# Patient Record
Sex: MALE | Race: WHITE | NOT HISPANIC OR LATINO
[De-identification: names, ages, dates, MRNs, and addresses within clinical notes are randomized per-mention and may not be internally consistent; named-entity substitution may affect disease eponyms.]

---

## 2018-04-05 ENCOUNTER — RX RENEWAL (OUTPATIENT)
Age: 63
End: 2018-04-05

## 2018-04-05 DIAGNOSIS — I10 ESSENTIAL (PRIMARY) HYPERTENSION: ICD-10-CM

## 2020-10-28 ENCOUNTER — APPOINTMENT (OUTPATIENT)
Dept: ORTHOPEDIC SURGERY | Facility: CLINIC | Age: 65
End: 2020-10-28
Payer: MEDICARE

## 2020-10-28 VITALS
SYSTOLIC BLOOD PRESSURE: 125 MMHG | HEART RATE: 56 BPM | WEIGHT: 280 LBS | BODY MASS INDEX: 39.2 KG/M2 | HEIGHT: 71 IN | DIASTOLIC BLOOD PRESSURE: 81 MMHG

## 2020-10-28 DIAGNOSIS — M48.07 SPINAL STENOSIS, LUMBOSACRAL REGION: ICD-10-CM

## 2020-10-28 PROCEDURE — 99204 OFFICE O/P NEW MOD 45 MIN: CPT

## 2020-11-05 ENCOUNTER — APPOINTMENT (OUTPATIENT)
Dept: SPINE | Facility: CLINIC | Age: 65
End: 2020-11-05
Payer: MEDICARE

## 2020-11-05 VITALS
HEART RATE: 68 BPM | HEIGHT: 70.5 IN | OXYGEN SATURATION: 93 % | SYSTOLIC BLOOD PRESSURE: 112 MMHG | DIASTOLIC BLOOD PRESSURE: 78 MMHG | WEIGHT: 280 LBS | BODY MASS INDEX: 39.64 KG/M2

## 2020-11-05 PROCEDURE — 99203 OFFICE O/P NEW LOW 30 MIN: CPT

## 2020-11-05 RX ORDER — TAMSULOSIN HCL 0.4 MG
CAPSULE ORAL
Refills: 0 | Status: ACTIVE | COMMUNITY

## 2020-11-05 RX ORDER — TRIAMTERENE AND HYDROCHLOROTHIAZIDE 25; 37.5 MG/1; MG/1
37.5-25 TABLET ORAL
Refills: 0 | Status: ACTIVE | COMMUNITY

## 2020-11-05 RX ORDER — QUINAPRIL HYDROCHLORIDE 20 MG/1
20 TABLET, FILM COATED ORAL
Refills: 0 | Status: ACTIVE | COMMUNITY

## 2020-11-05 RX ORDER — OXYCODONE HYDROCHLORIDE AND ACETAMINOPHEN 5; 325 MG/1; MG/1
5-325 TABLET ORAL
Refills: 0 | Status: ACTIVE | COMMUNITY

## 2020-11-05 RX ORDER — OMEPRAZOLE 40 MG/1
40 CAPSULE, DELAYED RELEASE ORAL
Refills: 0 | Status: ACTIVE | COMMUNITY

## 2020-11-05 RX ORDER — AMLODIPINE BESYLATE 5 MG/1
5 TABLET ORAL DAILY
Qty: 90 | Refills: 3 | Status: DISCONTINUED | COMMUNITY
Start: 2018-04-05 | End: 2020-11-05

## 2020-11-05 RX ORDER — ATORVASTATIN CALCIUM 10 MG/1
10 TABLET, FILM COATED ORAL
Refills: 0 | Status: ACTIVE | COMMUNITY

## 2020-11-05 NOTE — HISTORY OF PRESENT ILLNESS
[> 3 months] : more  than 3 months [FreeTextEntry1] : Neck and arm pain  [de-identified] : 65 year old male with a chronic history of neck and back pain for over five years.  He has arm radiculopathy as well.  Over the last few years he has lost a significant amount of weight.  He believes in the last two months he has noticed weakness of his hands and difficulty with coordination.  He does report balance issues and no falls.  He has pain and cracking in the neck rated at 2/10.  He was recommended not to undergo a course of PT and no chiropractor.  No epidural injections for the neck .   He is on Voltaren for his pain which is effective but not lasting.  The cervical MRI shows multiple levels, C 3 C 4 , C 4 C 5, C 5 C 6, of osteophytes, spondylolisthesis and a kyphotic deformity.  There is pressure and compression of the spinal cord at these levels.  In addition he has lumbar stenosis and is treated with intermittent epidural injections which keeps it under control.  He has chronic back pain.

## 2020-11-05 NOTE — REVIEW OF SYSTEMS
[Leg Weakness] : leg weakness [Poor Coordination] : poor coordination [Numbness] : numbness [Tingling] : tingling [Difficulty Walking] : difficulty walking [Negative] : Heme/Lymph [de-identified] : neck and arm  pain

## 2020-11-05 NOTE — REASON FOR VISIT
[New Patient Visit] : a new patient visit [Other: _____] : [unfilled] [Referred By: _________] : Patient was referred by MORIAH [FreeTextEntry1] : Cervical Myelopathy

## 2020-11-05 NOTE — ASSESSMENT
[FreeTextEntry1] : 65 year old male with cervical myelopathy and severe stenosis, kyphosis and multiple levels of osteophytes and compression.  He was instructed to obtain a cervical CT scan and cervical flexion/extension xrays of his cervical spine for preop purposes.  Surgery was discussed and both an anterior and posterior approach was explained.  Recovery was explained.  It was recommended to undergo surgical repair to prevent any further neurological damage and deficits.  Once the CT scan is completed he will return to discuss the treatment option in further detail.   No lifting.      \par \par \par \par CC:\par Amauri Good., MD\par [address] [facility] \[place] [address]\par \par Hero Mcdowell MD\par [address] [facility]\par [place], [place]

## 2020-11-05 NOTE — DATA REVIEWED
[de-identified] : MRI of cervical spine form Stand UP MRI  from 10/15/2020,   MRI of the lumbar spine from stand up MRI 4/15/2016

## 2020-11-05 NOTE — CONSULT LETTER
[Dear  ___] : Dear  [unfilled], [Courtesy Letter:] : I had the pleasure of seeing your patient, [unfilled], in my office today. [Please see my note below.] : Please see my note below. [Consult Closing:] : Thank you very much for allowing me to participate in the care of this patient.  If you have any questions, please do not hesitate to contact me. [Sincerely,] : Sincerely, [FreeTextEntry3] : Amador Maloney MD\par Chief of Neurosurgery [facility]\par [facility]\par

## 2020-11-05 NOTE — PHYSICAL EXAM
[Person] : oriented to person [Place] : oriented to place [Time] : oriented to time [Short Term Intact] : short term memory intact [Remote Intact] : remote memory intact [Span Intact] : the attention span was normal [Concentration Intact] : normal concentrating ability [Fluency] : fluency intact [Comprehension] : comprehension intact [Current Events] : adequate knowledge of current events [Past History] : adequate knowledge of personal past history [Vocabulary] : adequate range of vocabulary [Cranial Nerves Optic (II)] : visual acuity intact bilaterally,  pupils equal round and reactive to light [Cranial Nerves Oculomotor (III)] : extraocular motion intact [Cranial Nerves Trigeminal (V)] : facial sensation intact symmetrically [Cranial Nerves Facial (VII)] : face symmetrical [Cranial Nerves Vestibulocochlear (VIII)] : hearing was intact bilaterally [Cranial Nerves Glossopharyngeal (IX)] : tongue and palate midline [Cranial Nerves Accessory (XI - Cranial And Spinal)] : head turning and shoulder shrug symmetric [Cranial Nerves Hypoglossal (XII)] : there was no tongue deviation with protrusion [Motor Tone] : muscle tone was normal in all four extremities [No Muscle Atrophy] : normal bulk in all four extremities [Sensation Tactile Decrease] : light touch was intact [Abnormal Walk] : normal gait [Past-pointing] : there was no past-pointing [Tremor] : no tremor present [2+] : Ankle jerk left 2+ [Plantar Reflex Right Only] : normal on the right [Plantar Reflex Left Only] : normal on the left [Woodward] : Woodward's sign was not demonstrated [FreeTextEntry6] : mild  and proximal lower extremity weakness 4/5 [FreeTextEntry8] : wide-based [L'Hermitte's] : neck flexion did not produce tingling down the spine/arms [Spurling's - Opposite Side] : Negative Spurling's on opposite side [Spurling's Same Side] : Negative Spurling's on same side [FreeTextEntry1] : significantly reduced range of motion in all directions

## 2020-11-09 ENCOUNTER — RESULT REVIEW (OUTPATIENT)
Age: 65
End: 2020-11-09

## 2020-11-09 ENCOUNTER — OUTPATIENT (OUTPATIENT)
Dept: OUTPATIENT SERVICES | Facility: HOSPITAL | Age: 65
LOS: 1 days | End: 2020-11-09
Payer: MEDICARE

## 2020-11-09 ENCOUNTER — APPOINTMENT (OUTPATIENT)
Dept: CT IMAGING | Facility: CLINIC | Age: 65
End: 2020-11-09
Payer: MEDICARE

## 2020-11-09 ENCOUNTER — APPOINTMENT (OUTPATIENT)
Dept: RADIOLOGY | Facility: CLINIC | Age: 65
End: 2020-11-09
Payer: MEDICARE

## 2020-11-09 ENCOUNTER — TRANSCRIPTION ENCOUNTER (OUTPATIENT)
Age: 65
End: 2020-11-09

## 2020-11-09 DIAGNOSIS — M48.02 SPINAL STENOSIS, CERVICAL REGION: ICD-10-CM

## 2020-11-09 PROCEDURE — 72040 X-RAY EXAM NECK SPINE 2-3 VW: CPT | Mod: 26

## 2020-11-09 PROCEDURE — 72125 CT NECK SPINE W/O DYE: CPT | Mod: 26

## 2020-11-09 PROCEDURE — 72125 CT NECK SPINE W/O DYE: CPT

## 2020-11-09 PROCEDURE — 72040 X-RAY EXAM NECK SPINE 2-3 VW: CPT

## 2020-11-11 ENCOUNTER — APPOINTMENT (OUTPATIENT)
Dept: ORTHOPEDIC SURGERY | Facility: CLINIC | Age: 65
End: 2020-11-11

## 2020-11-12 ENCOUNTER — APPOINTMENT (OUTPATIENT)
Dept: SPINE | Facility: CLINIC | Age: 65
End: 2020-11-12
Payer: MEDICARE

## 2020-11-12 VITALS
HEART RATE: 67 BPM | BODY MASS INDEX: 39.64 KG/M2 | SYSTOLIC BLOOD PRESSURE: 132 MMHG | OXYGEN SATURATION: 92 % | DIASTOLIC BLOOD PRESSURE: 89 MMHG | TEMPERATURE: 97.2 F | HEIGHT: 70.5 IN | WEIGHT: 280 LBS

## 2020-11-12 DIAGNOSIS — Z87.19 PERSONAL HISTORY OF OTHER DISEASES OF THE DIGESTIVE SYSTEM: ICD-10-CM

## 2020-11-12 DIAGNOSIS — Z86.79 PERSONAL HISTORY OF OTHER DISEASES OF THE CIRCULATORY SYSTEM: ICD-10-CM

## 2020-11-12 DIAGNOSIS — Z86.39 PERSONAL HISTORY OF OTHER ENDOCRINE, NUTRITIONAL AND METABOLIC DISEASE: ICD-10-CM

## 2020-11-12 PROCEDURE — 99212 OFFICE O/P EST SF 10 MIN: CPT

## 2020-11-12 NOTE — DATA REVIEWED
[de-identified] : Cervical CT scan [facility] 11/9/2020 [de-identified] : MRI  of cervical spine  from  11/4/2020  Stand Up MRI from [facility]

## 2020-11-12 NOTE — PHYSICAL EXAM
[Woodward] : Woodward's sign was not demonstrated [FreeTextEntry6] : mild  and proximal lower extremity weakness.

## 2020-11-12 NOTE — REVIEW OF SYSTEMS
[Numbness] : numbness [Tingling] : tingling [Difficulty Walking] : difficulty walking [Negative] : Heme/Lymph [de-identified] : neck pain

## 2020-11-12 NOTE — DATA REVIEWED
[de-identified] : Cervical CT scan [facility] 11/9/2020 [de-identified] : MRI  of cervical spine  from  11/4/2020  Stand Up MRI from [facility]

## 2020-11-12 NOTE — ASSESSMENT
[FreeTextEntry1] : 65 year old male with cervical stenosis and multiple osteophytes with kyphotic deformity and cord compression.  A two staged cervical fusion with anterior and posterior approaches from C 2  to T 2.  A C3-C6 ACDF will likely be stage I with possible C6 corpectomy.  The procedure and alternatives with risks  were addressed and discussed.  The recovery phase and success rate was explained. \par  All of his  questions were  answered to his satisfaction.

## 2020-11-12 NOTE — REVIEW OF SYSTEMS
[Numbness] : numbness [Tingling] : tingling [Difficulty Walking] : difficulty walking [Negative] : Heme/Lymph [de-identified] : neck pain

## 2020-11-12 NOTE — REASON FOR VISIT
[Follow-Up: _____] : a [unfilled] follow-up visit [Other: _____] : [unfilled] [FreeTextEntry1] : 65 year old male with  cervical radiculopathy and myelopathy with balance issues.  He is pending a two staged surgical procedure for an anterior and posterior cervical fusion.  He brings a CT and MRI for review to discuss the procedure and additional findings on imaging.   The CT shows findings similar to the MRI and his surgery will be scheduled in the upcoming weeks.   Flexion-extension x-rays show that he can get to about a neutral position but there is no lordosis. CAT scan shows a there is a significant bone spur anteriorly behind the body of C6 beginning at the C5-6 disc space.

## 2020-11-13 PROBLEM — Z86.39 HISTORY OF HYPERCHOLESTEROLEMIA: Status: RESOLVED | Noted: 2020-11-13 | Resolved: 2020-11-13

## 2020-11-13 PROBLEM — Z86.79 HISTORY OF HYPERTENSION: Status: RESOLVED | Noted: 2020-11-13 | Resolved: 2020-11-13

## 2020-11-13 PROBLEM — Z87.19 HISTORY OF GASTROESOPHAGEAL REFLUX (GERD): Status: RESOLVED | Noted: 2020-11-13 | Resolved: 2020-11-13

## 2020-11-17 ENCOUNTER — NON-APPOINTMENT (OUTPATIENT)
Age: 65
End: 2020-11-17

## 2020-11-24 ENCOUNTER — OUTPATIENT (OUTPATIENT)
Dept: OUTPATIENT SERVICES | Facility: HOSPITAL | Age: 65
LOS: 1 days | End: 2020-11-24
Payer: MEDICARE

## 2020-11-24 VITALS
SYSTOLIC BLOOD PRESSURE: 122 MMHG | WEIGHT: 279.99 LBS | TEMPERATURE: 98 F | DIASTOLIC BLOOD PRESSURE: 73 MMHG | HEIGHT: 70.5 IN | HEART RATE: 64 BPM | OXYGEN SATURATION: 95 % | RESPIRATION RATE: 20 BRPM

## 2020-11-24 DIAGNOSIS — Z96.642 PRESENCE OF LEFT ARTIFICIAL HIP JOINT: Chronic | ICD-10-CM

## 2020-11-24 DIAGNOSIS — M48.02 SPINAL STENOSIS, CERVICAL REGION: ICD-10-CM

## 2020-11-24 DIAGNOSIS — Z01.818 ENCOUNTER FOR OTHER PREPROCEDURAL EXAMINATION: ICD-10-CM

## 2020-11-24 DIAGNOSIS — Z98.890 OTHER SPECIFIED POSTPROCEDURAL STATES: Chronic | ICD-10-CM

## 2020-11-24 DIAGNOSIS — Z96.659 PRESENCE OF UNSPECIFIED ARTIFICIAL KNEE JOINT: Chronic | ICD-10-CM

## 2020-11-24 DIAGNOSIS — Z29.9 ENCOUNTER FOR PROPHYLACTIC MEASURES, UNSPECIFIED: ICD-10-CM

## 2020-11-24 LAB
ANION GAP SERPL CALC-SCNC: 13 MMOL/L — SIGNIFICANT CHANGE UP (ref 5–17)
BLD GP AB SCN SERPL QL: NEGATIVE — SIGNIFICANT CHANGE UP
BUN SERPL-MCNC: 15 MG/DL — SIGNIFICANT CHANGE UP (ref 7–23)
CALCIUM SERPL-MCNC: 9.6 MG/DL — SIGNIFICANT CHANGE UP (ref 8.4–10.5)
CHLORIDE SERPL-SCNC: 99 MMOL/L — SIGNIFICANT CHANGE UP (ref 96–108)
CO2 SERPL-SCNC: 25 MMOL/L — SIGNIFICANT CHANGE UP (ref 22–31)
CREAT SERPL-MCNC: 0.62 MG/DL — SIGNIFICANT CHANGE UP (ref 0.5–1.3)
GLUCOSE SERPL-MCNC: 107 MG/DL — HIGH (ref 70–99)
HCT VFR BLD CALC: 49.2 % — SIGNIFICANT CHANGE UP (ref 39–50)
HGB BLD-MCNC: 16.1 G/DL — SIGNIFICANT CHANGE UP (ref 13–17)
MCHC RBC-ENTMCNC: 31.1 PG — SIGNIFICANT CHANGE UP (ref 27–34)
MCHC RBC-ENTMCNC: 32.7 GM/DL — SIGNIFICANT CHANGE UP (ref 32–36)
MCV RBC AUTO: 95 FL — SIGNIFICANT CHANGE UP (ref 80–100)
MRSA PCR RESULT.: SIGNIFICANT CHANGE UP
NRBC # BLD: 0 /100 WBCS — SIGNIFICANT CHANGE UP (ref 0–0)
PLATELET # BLD AUTO: 223 K/UL — SIGNIFICANT CHANGE UP (ref 150–400)
POTASSIUM SERPL-MCNC: 3.9 MMOL/L — SIGNIFICANT CHANGE UP (ref 3.5–5.3)
POTASSIUM SERPL-SCNC: 3.9 MMOL/L — SIGNIFICANT CHANGE UP (ref 3.5–5.3)
RBC # BLD: 5.18 M/UL — SIGNIFICANT CHANGE UP (ref 4.2–5.8)
RBC # FLD: 13 % — SIGNIFICANT CHANGE UP (ref 10.3–14.5)
RH IG SCN BLD-IMP: POSITIVE — SIGNIFICANT CHANGE UP
S AUREUS DNA NOSE QL NAA+PROBE: SIGNIFICANT CHANGE UP
SODIUM SERPL-SCNC: 137 MMOL/L — SIGNIFICANT CHANGE UP (ref 135–145)
WBC # BLD: 4.62 K/UL — SIGNIFICANT CHANGE UP (ref 3.8–10.5)
WBC # FLD AUTO: 4.62 K/UL — SIGNIFICANT CHANGE UP (ref 3.8–10.5)

## 2020-11-24 PROCEDURE — 87640 STAPH A DNA AMP PROBE: CPT

## 2020-11-24 PROCEDURE — 86900 BLOOD TYPING SEROLOGIC ABO: CPT

## 2020-11-24 PROCEDURE — 87641 MR-STAPH DNA AMP PROBE: CPT

## 2020-11-24 PROCEDURE — 80048 BASIC METABOLIC PNL TOTAL CA: CPT

## 2020-11-24 PROCEDURE — G0463: CPT

## 2020-11-24 PROCEDURE — 86901 BLOOD TYPING SEROLOGIC RH(D): CPT

## 2020-11-24 PROCEDURE — 85027 COMPLETE CBC AUTOMATED: CPT

## 2020-11-24 PROCEDURE — 86850 RBC ANTIBODY SCREEN: CPT

## 2020-11-24 RX ORDER — LIDOCAINE HCL 20 MG/ML
0.2 VIAL (ML) INJECTION ONCE
Refills: 0 | Status: DISCONTINUED | OUTPATIENT
Start: 2020-12-08 | End: 2020-12-08

## 2020-11-24 RX ORDER — SODIUM CHLORIDE 9 MG/ML
3 INJECTION INTRAMUSCULAR; INTRAVENOUS; SUBCUTANEOUS EVERY 8 HOURS
Refills: 0 | Status: DISCONTINUED | OUTPATIENT
Start: 2020-12-08 | End: 2020-12-08

## 2020-11-24 RX ORDER — CEFAZOLIN SODIUM 1 G
3000 VIAL (EA) INJECTION ONCE
Refills: 0 | Status: DISCONTINUED | OUTPATIENT
Start: 2020-12-08 | End: 2020-12-10

## 2020-11-24 RX ORDER — CHLORHEXIDINE GLUCONATE 213 G/1000ML
1 SOLUTION TOPICAL ONCE
Refills: 0 | Status: DISCONTINUED | OUTPATIENT
Start: 2020-12-08 | End: 2020-12-08

## 2020-11-24 NOTE — H&P PST ADULT - HISTORY OF PRESENT ILLNESS
COVID swab 12/5    COVID Yosi 64 yo male with h/o HTN, Hyperlipidemia, obesity, chronic back problems for many years, developed shoulder pain radiating to BUE about 3 mos ago, MRI and CT scan revealed herniated disc and spinal stenosis. Now for surgical intervention.     COVID swab 12/5 [facility]     COVID Britany/ no hospitalizations

## 2020-11-24 NOTE — H&P PST ADULT - NSICDXPASTMEDICALHX_GEN_ALL_CORE_FT
PAST MEDICAL HISTORY:  BPH (benign prostatic hyperplasia)     Complication of Bradley catheter, initial encounter 2010    Gout     H/O gastroesophageal reflux (GERD)     HTN (hypertension)     Hyperlipidemia     OA (osteoarthritis)      PAST MEDICAL HISTORY:  BPH (benign prostatic hyperplasia)     Complication of Bradley catheter, initial encounter 2010    Gout     H/O gastroesophageal reflux (GERD)     HTN (hypertension)     Hyperlipidemia     OA (osteoarthritis)     Obesity (BMI 30-39.9)

## 2020-11-24 NOTE — H&P PST ADULT - NSICDXPROBLEM_GEN_ALL_CORE_FT
PROBLEM DIAGNOSES  Problem: Spinal stenosis, cervical region  Assessment and Plan: stage 1  c3-c6 anterior cervical discectomy and fusion   D/C aspirin/diclenofac one week preop    Problem: Need for prophylactic measure  Assessment and Plan: The Caprini score indicates this patient is at risk for a VTE event (score 3-5).  Most surgical patients in this group would benefit from pharmacologic prophylaxis.  The surgical team will determine the balance between VTE risk and bleeding risk

## 2020-11-24 NOTE — H&P PST ADULT - NSICDXPASTSURGICALHX_GEN_ALL_CORE_FT
PAST SURGICAL HISTORY:  S/P hip replacement, left     S/P knee replacement right 2018    S/P panniculectomy

## 2020-11-24 NOTE — H&P PST ADULT - ASSESSMENT
TONYI VTE 2.0 SCORE [CLOT updated 2019]    AGE RELATED RISK FACTORS                                                       MOBILITY RELATED FACTORS  [ ] Age 41-60 years                                            (1 Point)                    [ ] Bed rest                                                        (1 Point)  [x ] Age: 61-74 years                                           (2 Points)                  [ ] Plaster cast                                                   (2 Points)  [ ] Age= 75 years                                              (3 Points)                    [ ] Bed bound for more than 72 hours                 (2 Points)    DISEASE RELATED RISK FACTORS                                               GENDER SPECIFIC FACTORS  [ ] Edema in the lower extremities                       (1 Point)              [ ] Pregnancy                                                     (1 Point)  [ ] Varicose veins                                               (1 Point)                     [ ] Post-partum < 6 weeks                                   (1 Point)             [x BMI > 25 Kg/m2                                            (1 Point)                     [ ] Hormonal therapy  or oral contraception          (1 Point)                 [ ] Sepsis (in the previous month)                        (1 Point)               [ ] History of pregnancy complications                 (1 point)  [ ] Pneumonia or serious lung disease                                               [ ] Unexplained or recurrent                     (1 Point)           (in the previous month)                               (1 Point)  [ ] Abnormal pulmonary function test                     (1 Point)                 SURGERY RELATED RISK FACTORS  [ ] Acute myocardial infarction                              (1 Point)               [ ]  Section                                             (1 Point)  [ ] Congestive heart failure (in the previous month)  (1 Point)      [ ] Minor surgery                                                  (1 Point)   [ ] Inflammatory bowel disease                             (1 Point)               [ ] Arthroscopic surgery                                        (2 Points)  [ ] Central venous access                                      (2 Points)                [ x] General surgery lasting more than 45 minutes (2 points)  [ ] Malignancy- Present or previous                   (2 Points)                [ ] Elective arthroplasty                                         (5 points)    [ ] Stroke (in the previous month)                          (5 Points)                                                                                                                                                           HEMATOLOGY RELATED FACTORS                                                 TRAUMA RELATED RISK FACTORS  [ ] Prior episodes of VTE                                     (3 Points)                [ ] Fracture of the hip, pelvis, or leg                       (5 Points)  [ ] Positive family history for VTE                         (3 Points)             [ ] Acute spinal cord injury (in the previous month)  (5 Points)  [ ] Prothrombin 87871 A                                     (3 Points)               [ ] Paralysis  (less than 1 month)                             (5 Points)  [ ] Factor V Leiden                                             (3 Points)                  [ ] Multiple Trauma within 1 month                        (5 Points)  [ ] Lupus anticoagulants                                     (3 Points)                                                           [ ] Anticardiolipin antibodies                               (3 Points)                                                       [ ] High homocysteine in the blood                      (3 Points)                                             [ ] Other congenital or acquired thrombophilia      (3 Points)                                                [ ] Heparin induced thrombocytopenia                  (3 Points)                                     Total Score [    5      ]

## 2020-11-27 PROBLEM — I10 ESSENTIAL (PRIMARY) HYPERTENSION: Chronic | Status: ACTIVE | Noted: 2020-11-24

## 2020-11-27 PROBLEM — M10.9 GOUT, UNSPECIFIED: Chronic | Status: ACTIVE | Noted: 2020-11-24

## 2020-11-27 PROBLEM — E66.9 OBESITY, UNSPECIFIED: Chronic | Status: ACTIVE | Noted: 2020-11-24

## 2020-11-27 PROBLEM — T83.9XXA UNSPECIFIED COMPLICATION OF GENITOURINARY PROSTHETIC DEVICE, IMPLANT AND GRAFT, INITIAL ENCOUNTER: Chronic | Status: ACTIVE | Noted: 2020-11-24

## 2020-11-27 PROBLEM — N40.0 BENIGN PROSTATIC HYPERPLASIA WITHOUT LOWER URINARY TRACT SYMPTOMS: Chronic | Status: ACTIVE | Noted: 2020-11-24

## 2020-11-27 PROBLEM — Z87.19 PERSONAL HISTORY OF OTHER DISEASES OF THE DIGESTIVE SYSTEM: Chronic | Status: ACTIVE | Noted: 2020-11-24

## 2020-11-27 PROBLEM — E78.5 HYPERLIPIDEMIA, UNSPECIFIED: Chronic | Status: ACTIVE | Noted: 2020-11-24

## 2020-11-27 PROBLEM — M19.90 UNSPECIFIED OSTEOARTHRITIS, UNSPECIFIED SITE: Chronic | Status: ACTIVE | Noted: 2020-11-24

## 2020-11-30 ENCOUNTER — RESULT REVIEW (OUTPATIENT)
Age: 65
End: 2020-11-30

## 2020-11-30 ENCOUNTER — APPOINTMENT (OUTPATIENT)
Dept: ULTRASOUND IMAGING | Facility: CLINIC | Age: 65
End: 2020-11-30
Payer: MEDICARE

## 2020-11-30 ENCOUNTER — OUTPATIENT (OUTPATIENT)
Dept: OUTPATIENT SERVICES | Facility: HOSPITAL | Age: 65
LOS: 1 days | End: 2020-11-30
Payer: MEDICARE

## 2020-11-30 DIAGNOSIS — Z96.642 PRESENCE OF LEFT ARTIFICIAL HIP JOINT: Chronic | ICD-10-CM

## 2020-11-30 DIAGNOSIS — Z00.8 ENCOUNTER FOR OTHER GENERAL EXAMINATION: ICD-10-CM

## 2020-11-30 DIAGNOSIS — Z96.659 PRESENCE OF UNSPECIFIED ARTIFICIAL KNEE JOINT: Chronic | ICD-10-CM

## 2020-11-30 DIAGNOSIS — Z98.890 OTHER SPECIFIED POSTPROCEDURAL STATES: Chronic | ICD-10-CM

## 2020-11-30 PROCEDURE — 76536 US EXAM OF HEAD AND NECK: CPT | Mod: 26

## 2020-11-30 PROCEDURE — 76536 US EXAM OF HEAD AND NECK: CPT

## 2020-12-03 ENCOUNTER — APPOINTMENT (OUTPATIENT)
Dept: ULTRASOUND IMAGING | Facility: CLINIC | Age: 65
End: 2020-12-03
Payer: MEDICARE

## 2020-12-03 ENCOUNTER — OUTPATIENT (OUTPATIENT)
Dept: OUTPATIENT SERVICES | Facility: HOSPITAL | Age: 65
LOS: 1 days | End: 2020-12-03
Payer: MEDICARE

## 2020-12-03 DIAGNOSIS — Z96.659 PRESENCE OF UNSPECIFIED ARTIFICIAL KNEE JOINT: Chronic | ICD-10-CM

## 2020-12-03 DIAGNOSIS — Z00.8 ENCOUNTER FOR OTHER GENERAL EXAMINATION: ICD-10-CM

## 2020-12-03 DIAGNOSIS — Z98.890 OTHER SPECIFIED POSTPROCEDURAL STATES: Chronic | ICD-10-CM

## 2020-12-03 DIAGNOSIS — Z96.642 PRESENCE OF LEFT ARTIFICIAL HIP JOINT: Chronic | ICD-10-CM

## 2020-12-03 PROCEDURE — 76770 US EXAM ABDO BACK WALL COMP: CPT

## 2020-12-03 PROCEDURE — 76770 US EXAM ABDO BACK WALL COMP: CPT | Mod: 26

## 2020-12-03 PROCEDURE — 76775 US EXAM ABDO BACK WALL LIM: CPT | Mod: 26

## 2020-12-05 ENCOUNTER — OUTPATIENT (OUTPATIENT)
Dept: OUTPATIENT SERVICES | Facility: HOSPITAL | Age: 65
LOS: 1 days | End: 2020-12-05

## 2020-12-05 DIAGNOSIS — Z11.59 ENCOUNTER FOR SCREENING FOR OTHER VIRAL DISEASES: ICD-10-CM

## 2020-12-05 DIAGNOSIS — Z98.890 OTHER SPECIFIED POSTPROCEDURAL STATES: Chronic | ICD-10-CM

## 2020-12-05 DIAGNOSIS — Z96.642 PRESENCE OF LEFT ARTIFICIAL HIP JOINT: Chronic | ICD-10-CM

## 2020-12-05 DIAGNOSIS — Z96.659 PRESENCE OF UNSPECIFIED ARTIFICIAL KNEE JOINT: Chronic | ICD-10-CM

## 2020-12-05 LAB — SARS-COV-2 RNA SPEC QL NAA+PROBE: SIGNIFICANT CHANGE UP

## 2020-12-07 ENCOUNTER — TRANSCRIPTION ENCOUNTER (OUTPATIENT)
Age: 65
End: 2020-12-07

## 2020-12-08 ENCOUNTER — INPATIENT (INPATIENT)
Facility: HOSPITAL | Age: 65
LOS: 1 days | Discharge: HOME HEALTH | DRG: 472 | End: 2020-12-10
Attending: NEUROLOGICAL SURGERY | Admitting: NEUROLOGICAL SURGERY
Payer: MEDICARE

## 2020-12-08 ENCOUNTER — APPOINTMENT (OUTPATIENT)
Dept: SPINE | Facility: HOSPITAL | Age: 65
End: 2020-12-08

## 2020-12-08 VITALS
RESPIRATION RATE: 20 BRPM | HEIGHT: 70.47 IN | OXYGEN SATURATION: 95 % | WEIGHT: 279.99 LBS | HEART RATE: 64 BPM | TEMPERATURE: 98 F | SYSTOLIC BLOOD PRESSURE: 122 MMHG | DIASTOLIC BLOOD PRESSURE: 73 MMHG

## 2020-12-08 DIAGNOSIS — Z96.642 PRESENCE OF LEFT ARTIFICIAL HIP JOINT: Chronic | ICD-10-CM

## 2020-12-08 DIAGNOSIS — M48.02 SPINAL STENOSIS, CERVICAL REGION: ICD-10-CM

## 2020-12-08 DIAGNOSIS — Z98.890 OTHER SPECIFIED POSTPROCEDURAL STATES: Chronic | ICD-10-CM

## 2020-12-08 DIAGNOSIS — Z96.659 PRESENCE OF UNSPECIFIED ARTIFICIAL KNEE JOINT: Chronic | ICD-10-CM

## 2020-12-08 LAB
ANION GAP SERPL CALC-SCNC: 13 MMOL/L — SIGNIFICANT CHANGE UP (ref 5–17)
BASOPHILS # BLD AUTO: 0 K/UL — SIGNIFICANT CHANGE UP (ref 0–0.2)
BASOPHILS NFR BLD AUTO: 0 % — SIGNIFICANT CHANGE UP (ref 0–2)
BUN SERPL-MCNC: 14 MG/DL — SIGNIFICANT CHANGE UP (ref 7–23)
CALCIUM SERPL-MCNC: 9.2 MG/DL — SIGNIFICANT CHANGE UP (ref 8.4–10.5)
CHLORIDE SERPL-SCNC: 100 MMOL/L — SIGNIFICANT CHANGE UP (ref 96–108)
CO2 SERPL-SCNC: 26 MMOL/L — SIGNIFICANT CHANGE UP (ref 22–31)
CREAT SERPL-MCNC: 0.56 MG/DL — SIGNIFICANT CHANGE UP (ref 0.5–1.3)
EOSINOPHIL # BLD AUTO: 0.04 K/UL — SIGNIFICANT CHANGE UP (ref 0–0.5)
EOSINOPHIL NFR BLD AUTO: 0.9 % — SIGNIFICANT CHANGE UP (ref 0–6)
GLUCOSE SERPL-MCNC: 125 MG/DL — HIGH (ref 70–99)
HCT VFR BLD CALC: 44.9 % — SIGNIFICANT CHANGE UP (ref 39–50)
HGB BLD-MCNC: 15.5 G/DL — SIGNIFICANT CHANGE UP (ref 13–17)
LYMPHOCYTES # BLD AUTO: 0.36 K/UL — LOW (ref 1–3.3)
LYMPHOCYTES # BLD AUTO: 7.8 % — LOW (ref 13–44)
MCHC RBC-ENTMCNC: 32.1 PG — SIGNIFICANT CHANGE UP (ref 27–34)
MCHC RBC-ENTMCNC: 34.5 GM/DL — SIGNIFICANT CHANGE UP (ref 32–36)
MCV RBC AUTO: 93 FL — SIGNIFICANT CHANGE UP (ref 80–100)
MONOCYTES # BLD AUTO: 0.16 K/UL — SIGNIFICANT CHANGE UP (ref 0–0.9)
MONOCYTES NFR BLD AUTO: 3.5 % — SIGNIFICANT CHANGE UP (ref 2–14)
NEUTROPHILS # BLD AUTO: 4.01 K/UL — SIGNIFICANT CHANGE UP (ref 1.8–7.4)
NEUTROPHILS NFR BLD AUTO: 84.3 % — HIGH (ref 43–77)
PLATELET # BLD AUTO: 195 K/UL — SIGNIFICANT CHANGE UP (ref 150–400)
POTASSIUM SERPL-MCNC: 3.5 MMOL/L — SIGNIFICANT CHANGE UP (ref 3.5–5.3)
POTASSIUM SERPL-SCNC: 3.5 MMOL/L — SIGNIFICANT CHANGE UP (ref 3.5–5.3)
RBC # BLD: 4.83 M/UL — SIGNIFICANT CHANGE UP (ref 4.2–5.8)
RBC # FLD: 13.1 % — SIGNIFICANT CHANGE UP (ref 10.3–14.5)
RH IG SCN BLD-IMP: POSITIVE — SIGNIFICANT CHANGE UP
SODIUM SERPL-SCNC: 139 MMOL/L — SIGNIFICANT CHANGE UP (ref 135–145)
WBC # BLD: 4.61 K/UL — SIGNIFICANT CHANGE UP (ref 3.8–10.5)
WBC # FLD AUTO: 4.61 K/UL — SIGNIFICANT CHANGE UP (ref 3.8–10.5)

## 2020-12-08 PROCEDURE — 22552 ARTHRD ANT NTRBD CERVICAL EA: CPT | Mod: GC

## 2020-12-08 PROCEDURE — 22846 INSERT SPINE FIXATION DEVICE: CPT | Mod: 59,GC

## 2020-12-08 PROCEDURE — 22853 INSJ BIOMECHANICAL DEVICE: CPT | Mod: GC

## 2020-12-08 PROCEDURE — 22551 ARTHRD ANT NTRBDY CERVICAL: CPT | Mod: GC

## 2020-12-08 RX ORDER — SENNA PLUS 8.6 MG/1
2 TABLET ORAL AT BEDTIME
Refills: 0 | Status: DISCONTINUED | OUTPATIENT
Start: 2020-12-08 | End: 2020-12-10

## 2020-12-08 RX ORDER — MAGNESIUM HYDROXIDE 400 MG/1
30 TABLET, CHEWABLE ORAL EVERY 12 HOURS
Refills: 0 | Status: DISCONTINUED | OUTPATIENT
Start: 2020-12-08 | End: 2020-12-10

## 2020-12-08 RX ORDER — TRIAMTERENE/HYDROCHLOROTHIAZID 75 MG-50MG
1 TABLET ORAL DAILY
Refills: 0 | Status: DISCONTINUED | OUTPATIENT
Start: 2020-12-08 | End: 2020-12-10

## 2020-12-08 RX ORDER — TAMSULOSIN HYDROCHLORIDE 0.4 MG/1
1 CAPSULE ORAL
Qty: 0 | Refills: 0 | DISCHARGE

## 2020-12-08 RX ORDER — LISINOPRIL 2.5 MG/1
40 TABLET ORAL DAILY
Refills: 0 | Status: DISCONTINUED | OUTPATIENT
Start: 2020-12-08 | End: 2020-12-10

## 2020-12-08 RX ORDER — HYDROMORPHONE HYDROCHLORIDE 2 MG/ML
1 INJECTION INTRAMUSCULAR; INTRAVENOUS; SUBCUTANEOUS
Refills: 0 | Status: DISCONTINUED | OUTPATIENT
Start: 2020-12-08 | End: 2020-12-09

## 2020-12-08 RX ORDER — QUINAPRIL HYDROCHLORIDE 40 MG/1
1 TABLET, FILM COATED ORAL
Qty: 0 | Refills: 0 | DISCHARGE

## 2020-12-08 RX ORDER — POLYETHYLENE GLYCOL 3350 17 G/17G
17 POWDER, FOR SOLUTION ORAL DAILY
Refills: 0 | Status: DISCONTINUED | OUTPATIENT
Start: 2020-12-08 | End: 2020-12-09

## 2020-12-08 RX ORDER — CEFAZOLIN SODIUM 1 G
2000 VIAL (EA) INJECTION EVERY 8 HOURS
Refills: 0 | Status: COMPLETED | OUTPATIENT
Start: 2020-12-08 | End: 2020-12-09

## 2020-12-08 RX ORDER — OXYCODONE AND ACETAMINOPHEN 5; 325 MG/1; MG/1
1 TABLET ORAL
Qty: 0 | Refills: 0 | DISCHARGE

## 2020-12-08 RX ORDER — CLONAZEPAM 1 MG
0.5 TABLET ORAL THREE TIMES A DAY
Refills: 0 | Status: DISCONTINUED | OUTPATIENT
Start: 2020-12-08 | End: 2020-12-09

## 2020-12-08 RX ORDER — SODIUM CHLORIDE 9 MG/ML
1000 INJECTION, SOLUTION INTRAVENOUS
Refills: 0 | Status: DISCONTINUED | OUTPATIENT
Start: 2020-12-08 | End: 2020-12-09

## 2020-12-08 RX ORDER — ALPRAZOLAM 0.25 MG
1 TABLET ORAL
Qty: 0 | Refills: 0 | DISCHARGE

## 2020-12-08 RX ORDER — ONDANSETRON 8 MG/1
4 TABLET, FILM COATED ORAL ONCE
Refills: 0 | Status: DISCONTINUED | OUTPATIENT
Start: 2020-12-08 | End: 2020-12-09

## 2020-12-08 RX ORDER — CLONAZEPAM 1 MG
1 TABLET ORAL
Qty: 0 | Refills: 0 | DISCHARGE

## 2020-12-08 RX ORDER — HYDROMORPHONE HYDROCHLORIDE 2 MG/ML
0.5 INJECTION INTRAMUSCULAR; INTRAVENOUS; SUBCUTANEOUS
Refills: 0 | Status: DISCONTINUED | OUTPATIENT
Start: 2020-12-08 | End: 2020-12-09

## 2020-12-08 RX ORDER — TAMSULOSIN HYDROCHLORIDE 0.4 MG/1
0.4 CAPSULE ORAL AT BEDTIME
Refills: 0 | Status: DISCONTINUED | OUTPATIENT
Start: 2020-12-08 | End: 2020-12-10

## 2020-12-08 RX ORDER — OXYCODONE HYDROCHLORIDE 5 MG/1
5 TABLET ORAL
Refills: 0 | Status: DISCONTINUED | OUTPATIENT
Start: 2020-12-08 | End: 2020-12-10

## 2020-12-08 RX ORDER — TRIAMTERENE/HYDROCHLOROTHIAZID 75 MG-50MG
1 TABLET ORAL
Qty: 0 | Refills: 0 | DISCHARGE

## 2020-12-08 RX ORDER — PANTOPRAZOLE SODIUM 20 MG/1
40 TABLET, DELAYED RELEASE ORAL
Refills: 0 | Status: DISCONTINUED | OUTPATIENT
Start: 2020-12-08 | End: 2020-12-10

## 2020-12-08 RX ORDER — ATORVASTATIN CALCIUM 80 MG/1
10 TABLET, FILM COATED ORAL AT BEDTIME
Refills: 0 | Status: DISCONTINUED | OUTPATIENT
Start: 2020-12-08 | End: 2020-12-10

## 2020-12-08 RX ORDER — DEXAMETHASONE 0.5 MG/5ML
4 ELIXIR ORAL EVERY 6 HOURS
Refills: 0 | Status: COMPLETED | OUTPATIENT
Start: 2020-12-08 | End: 2020-12-09

## 2020-12-08 RX ORDER — ATORVASTATIN CALCIUM 80 MG/1
1 TABLET, FILM COATED ORAL
Qty: 0 | Refills: 0 | DISCHARGE

## 2020-12-08 RX ORDER — OMEPRAZOLE 10 MG/1
1 CAPSULE, DELAYED RELEASE ORAL
Qty: 0 | Refills: 0 | DISCHARGE

## 2020-12-08 RX ORDER — ONDANSETRON 8 MG/1
4 TABLET, FILM COATED ORAL EVERY 6 HOURS
Refills: 0 | Status: DISCONTINUED | OUTPATIENT
Start: 2020-12-08 | End: 2020-12-10

## 2020-12-08 RX ORDER — METHOCARBAMOL 500 MG/1
750 TABLET, FILM COATED ORAL THREE TIMES A DAY
Refills: 0 | Status: DISCONTINUED | OUTPATIENT
Start: 2020-12-08 | End: 2020-12-10

## 2020-12-08 RX ORDER — ACETAMINOPHEN 500 MG
1000 TABLET ORAL ONCE
Refills: 0 | Status: COMPLETED | OUTPATIENT
Start: 2020-12-08 | End: 2020-12-08

## 2020-12-08 RX ADMIN — SODIUM CHLORIDE 100 MILLILITER(S): 9 INJECTION, SOLUTION INTRAVENOUS at 21:27

## 2020-12-08 RX ADMIN — Medication 400 MILLIGRAM(S): at 20:42

## 2020-12-08 RX ADMIN — Medication 4 MILLIGRAM(S): at 23:25

## 2020-12-08 RX ADMIN — HYDROMORPHONE HYDROCHLORIDE 0.5 MILLIGRAM(S): 2 INJECTION INTRAMUSCULAR; INTRAVENOUS; SUBCUTANEOUS at 19:06

## 2020-12-08 RX ADMIN — OXYCODONE HYDROCHLORIDE 5 MILLIGRAM(S): 5 TABLET ORAL at 23:43

## 2020-12-08 RX ADMIN — Medication 1000 MILLIGRAM(S): at 21:15

## 2020-12-08 RX ADMIN — HYDROMORPHONE HYDROCHLORIDE 0.5 MILLIGRAM(S): 2 INJECTION INTRAMUSCULAR; INTRAVENOUS; SUBCUTANEOUS at 18:50

## 2020-12-08 RX ADMIN — OXYCODONE HYDROCHLORIDE 5 MILLIGRAM(S): 5 TABLET ORAL at 23:30

## 2020-12-08 RX ADMIN — HYDROMORPHONE HYDROCHLORIDE 0.5 MILLIGRAM(S): 2 INJECTION INTRAMUSCULAR; INTRAVENOUS; SUBCUTANEOUS at 19:10

## 2020-12-08 RX ADMIN — METHOCARBAMOL 750 MILLIGRAM(S): 500 TABLET, FILM COATED ORAL at 19:24

## 2020-12-08 RX ADMIN — SENNA PLUS 2 TABLET(S): 8.6 TABLET ORAL at 22:06

## 2020-12-08 RX ADMIN — TAMSULOSIN HYDROCHLORIDE 0.4 MILLIGRAM(S): 0.4 CAPSULE ORAL at 22:06

## 2020-12-08 RX ADMIN — Medication 100 MILLIGRAM(S): at 21:01

## 2020-12-08 RX ADMIN — ATORVASTATIN CALCIUM 10 MILLIGRAM(S): 80 TABLET, FILM COATED ORAL at 22:06

## 2020-12-08 NOTE — CHART NOTE - NSCHARTNOTEFT_GEN_A_CORE
CAPRINI SCORE [CLOT] Score on Admission for     AGE RELATED RISK FACTORS                                                       MOBILITY RELATED FACTORS  [ ] Age 41-60 years                                            (1 Point)                  [ ] Bed rest                                                        (1 Point)  [ x] Age 61-74 years                                           (2 Points)                 [ ] Plaster cast                                                   (2 Points)  [ ] Age > 75 years                                              (3 Points)                 [ ] Bed bound for more than 72 hours         (2 Points)    DISEASE RELATED RISK FACTORS                                               GENDER SPECIFIC FACTORS  [ ] Edema in the lower extremities                       (1 Point)           [ ] Pregnancy                                                          (1 Point)  [ ] Varicose veins                                               (1 Point)                  [ ] Post-partum < 6 weeks                                   (1 Point)             [x ] BMI > 25 Kg/m2                                            (1 Point)                  [ ] Hormonal therapy  or oral contraception   (1 Point)                 [ ] Sepsis (in the previous month)                        (1 Point)            [ ] History of pregnancy complications             (1 point)  [ ] Pneumonia or serious lung disease                                            [ ] Unexplained or recurrent               (1 Point)           (in the previous month)                               (1 Point)  [ ] Abnormal pulmonary function test                     (1 Point)                 SURGERY RELATED RISK FACTORS (include planned surgeries)  [ ] Acute myocardial infarction                              (1 Point)                 [ ]  Section                                             (1 Point)  [ ] Congestive heart failure (in the previous month)  (1 Point)        [ ] Minor surgery                                                  (1 Point)   [ ] Inflammatory bowel disease                             (1 Point)                 [ ] Arthroscopic surgery                                        (2 Points)  [ ] Central venous access                                      (2 Points)  [ ] History / presence of malignancy                   (2 points)   [ x] General surgery lasting more than 45 minutes   (2 Points)       [ ] Stroke (in the previous month)                          (5 Points)               [ ] Elective arthroplasty                                         (5 Points)                                                                                                                                               HEMATOLOGY RELATED FACTORS                                                 TRAUMA RELATED RISK FACTORS  [ ] Prior episodes of VTE                                     (3 Points)                [ ] Fracture of the hip, pelvis, or leg                       (5 Points)  [ ] Positive family history for VTE                         (3 Points)             [ ] Acute spinal cord injury (in the previous month)  (5 Points)  [ ] Prothrombin 02277 A                                     (3 Points)                [ ] Paralysis  (less than 1 month)                             (5 Points)  [ ] Factor V Leiden                                             (3 Points)                   [ ] Multiple Trauma within 1 month                        (5 Points)  [ ] Lupus anticoagulants                                     (3 Points)                                                           [ ] Anticardiolipin antibodies                               (3 Points)                                                       [ ] High homocysteine in the blood                      (3 Points)                                             [ ] Other congenital or acquired thrombophilia      (3 Points)                                                [ ] Heparin induced thrombocytopenia                  (3 Points)                                          Total Score [   5       ]    Risk:  Very low 0   Low 1 to 2   Moderate 3 to 4   High =5       VTE Prophylaxis Recommendations:  [x ] mechanical pneumatic compression devices                                      [ ] contraindicated: _____________________  [ ] chemoprophylaxis                                                                                    [x ] contraindicated: _fresh post op____________________    **** HIGH LIKELIHOOD DVT PRESENT ON ADMISSION  [x ] (please order LE dopplers within 24 hours of admission)

## 2020-12-08 NOTE — PRE-ANESTHESIA EVALUATION ADULT - NSANTHOSAYNRD_GEN_A_CORE
No. ENRICO screening performed.  STOP BANG Legend: 0-2 = LOW Risk; 3-4 = INTERMEDIATE Risk; 5-8 = HIGH Risk

## 2020-12-09 ENCOUNTER — TRANSCRIPTION ENCOUNTER (OUTPATIENT)
Age: 65
End: 2020-12-09

## 2020-12-09 DIAGNOSIS — M19.90 UNSPECIFIED OSTEOARTHRITIS, UNSPECIFIED SITE: ICD-10-CM

## 2020-12-09 DIAGNOSIS — E78.5 HYPERLIPIDEMIA, UNSPECIFIED: ICD-10-CM

## 2020-12-09 DIAGNOSIS — K21.9 GASTRO-ESOPHAGEAL REFLUX DISEASE WITHOUT ESOPHAGITIS: ICD-10-CM

## 2020-12-09 DIAGNOSIS — M10.9 GOUT, UNSPECIFIED: ICD-10-CM

## 2020-12-09 DIAGNOSIS — I10 ESSENTIAL (PRIMARY) HYPERTENSION: ICD-10-CM

## 2020-12-09 LAB
ANION GAP SERPL CALC-SCNC: 16 MMOL/L — SIGNIFICANT CHANGE UP (ref 5–17)
BASOPHILS # BLD AUTO: 0 K/UL — SIGNIFICANT CHANGE UP (ref 0–0.2)
BASOPHILS NFR BLD AUTO: 0 % — SIGNIFICANT CHANGE UP (ref 0–2)
BUN SERPL-MCNC: 12 MG/DL — SIGNIFICANT CHANGE UP (ref 7–23)
CALCIUM SERPL-MCNC: 9.4 MG/DL — SIGNIFICANT CHANGE UP (ref 8.4–10.5)
CHLORIDE SERPL-SCNC: 98 MMOL/L — SIGNIFICANT CHANGE UP (ref 96–108)
CO2 SERPL-SCNC: 26 MMOL/L — SIGNIFICANT CHANGE UP (ref 22–31)
CREAT SERPL-MCNC: 0.49 MG/DL — LOW (ref 0.5–1.3)
EOSINOPHIL # BLD AUTO: 0 K/UL — SIGNIFICANT CHANGE UP (ref 0–0.5)
EOSINOPHIL NFR BLD AUTO: 0 % — SIGNIFICANT CHANGE UP (ref 0–6)
GLUCOSE SERPL-MCNC: 120 MG/DL — HIGH (ref 70–99)
HCT VFR BLD CALC: 46.6 % — SIGNIFICANT CHANGE UP (ref 39–50)
HGB BLD-MCNC: 15.6 G/DL — SIGNIFICANT CHANGE UP (ref 13–17)
IMM GRANULOCYTES NFR BLD AUTO: 0.3 % — SIGNIFICANT CHANGE UP (ref 0–1.5)
LYMPHOCYTES # BLD AUTO: 0.41 K/UL — LOW (ref 1–3.3)
LYMPHOCYTES # BLD AUTO: 6.1 % — LOW (ref 13–44)
MCHC RBC-ENTMCNC: 31.3 PG — SIGNIFICANT CHANGE UP (ref 27–34)
MCHC RBC-ENTMCNC: 33.5 GM/DL — SIGNIFICANT CHANGE UP (ref 32–36)
MCV RBC AUTO: 93.6 FL — SIGNIFICANT CHANGE UP (ref 80–100)
MONOCYTES # BLD AUTO: 0.3 K/UL — SIGNIFICANT CHANGE UP (ref 0–0.9)
MONOCYTES NFR BLD AUTO: 4.5 % — SIGNIFICANT CHANGE UP (ref 2–14)
NEUTROPHILS # BLD AUTO: 5.98 K/UL — SIGNIFICANT CHANGE UP (ref 1.8–7.4)
NEUTROPHILS NFR BLD AUTO: 89.1 % — HIGH (ref 43–77)
NRBC # BLD: 0 /100 WBCS — SIGNIFICANT CHANGE UP (ref 0–0)
PLATELET # BLD AUTO: 228 K/UL — SIGNIFICANT CHANGE UP (ref 150–400)
POTASSIUM SERPL-MCNC: 3.3 MMOL/L — LOW (ref 3.5–5.3)
POTASSIUM SERPL-SCNC: 3.3 MMOL/L — LOW (ref 3.5–5.3)
RBC # BLD: 4.98 M/UL — SIGNIFICANT CHANGE UP (ref 4.2–5.8)
RBC # FLD: 13 % — SIGNIFICANT CHANGE UP (ref 10.3–14.5)
SODIUM SERPL-SCNC: 140 MMOL/L — SIGNIFICANT CHANGE UP (ref 135–145)
WBC # BLD: 6.71 K/UL — SIGNIFICANT CHANGE UP (ref 3.8–10.5)
WBC # FLD AUTO: 6.71 K/UL — SIGNIFICANT CHANGE UP (ref 3.8–10.5)

## 2020-12-09 PROCEDURE — 72141 MRI NECK SPINE W/O DYE: CPT | Mod: 26

## 2020-12-09 PROCEDURE — 93970 EXTREMITY STUDY: CPT | Mod: 26

## 2020-12-09 RX ORDER — ALPRAZOLAM 0.25 MG
0.5 TABLET ORAL AT BEDTIME
Refills: 0 | Status: DISCONTINUED | OUTPATIENT
Start: 2020-12-09 | End: 2020-12-10

## 2020-12-09 RX ORDER — DICLOFENAC SODIUM 75 MG/1
1 TABLET, DELAYED RELEASE ORAL
Qty: 0 | Refills: 0 | DISCHARGE

## 2020-12-09 RX ORDER — POTASSIUM CHLORIDE 20 MEQ
20 PACKET (EA) ORAL
Refills: 0 | Status: COMPLETED | OUTPATIENT
Start: 2020-12-09 | End: 2020-12-09

## 2020-12-09 RX ORDER — POLYETHYLENE GLYCOL 3350 17 G/17G
17 POWDER, FOR SOLUTION ORAL
Refills: 0 | Status: DISCONTINUED | OUTPATIENT
Start: 2020-12-09 | End: 2020-12-10

## 2020-12-09 RX ORDER — DIAZEPAM 5 MG
5 TABLET ORAL ONCE
Refills: 0 | Status: DISCONTINUED | OUTPATIENT
Start: 2020-12-09 | End: 2020-12-09

## 2020-12-09 RX ORDER — OXYCODONE HYDROCHLORIDE 5 MG/1
1 TABLET ORAL
Qty: 42 | Refills: 0
Start: 2020-12-09 | End: 2020-12-15

## 2020-12-09 RX ADMIN — SENNA PLUS 2 TABLET(S): 8.6 TABLET ORAL at 21:08

## 2020-12-09 RX ADMIN — Medication 5 MILLIGRAM(S): at 17:13

## 2020-12-09 RX ADMIN — OXYCODONE HYDROCHLORIDE 5 MILLIGRAM(S): 5 TABLET ORAL at 06:51

## 2020-12-09 RX ADMIN — Medication 1 DROP(S): at 08:02

## 2020-12-09 RX ADMIN — Medication 20 MILLIEQUIVALENT(S): at 17:17

## 2020-12-09 RX ADMIN — OXYCODONE HYDROCHLORIDE 5 MILLIGRAM(S): 5 TABLET ORAL at 16:30

## 2020-12-09 RX ADMIN — Medication 20 MILLIEQUIVALENT(S): at 15:44

## 2020-12-09 RX ADMIN — Medication 20 MILLIEQUIVALENT(S): at 14:00

## 2020-12-09 RX ADMIN — OXYCODONE HYDROCHLORIDE 5 MILLIGRAM(S): 5 TABLET ORAL at 02:36

## 2020-12-09 RX ADMIN — Medication 4 MILLIGRAM(S): at 17:16

## 2020-12-09 RX ADMIN — METHOCARBAMOL 750 MILLIGRAM(S): 500 TABLET, FILM COATED ORAL at 14:00

## 2020-12-09 RX ADMIN — OXYCODONE HYDROCHLORIDE 5 MILLIGRAM(S): 5 TABLET ORAL at 15:59

## 2020-12-09 RX ADMIN — Medication 20 MILLIGRAM(S): at 08:02

## 2020-12-09 RX ADMIN — Medication 4 MILLIGRAM(S): at 05:41

## 2020-12-09 RX ADMIN — Medication 20 MILLIGRAM(S): at 15:44

## 2020-12-09 RX ADMIN — OXYCODONE HYDROCHLORIDE 5 MILLIGRAM(S): 5 TABLET ORAL at 11:08

## 2020-12-09 RX ADMIN — METHOCARBAMOL 750 MILLIGRAM(S): 500 TABLET, FILM COATED ORAL at 21:08

## 2020-12-09 RX ADMIN — MAGNESIUM HYDROXIDE 30 MILLILITER(S): 400 TABLET, CHEWABLE ORAL at 14:02

## 2020-12-09 RX ADMIN — LISINOPRIL 40 MILLIGRAM(S): 2.5 TABLET ORAL at 05:41

## 2020-12-09 RX ADMIN — METHOCARBAMOL 750 MILLIGRAM(S): 500 TABLET, FILM COATED ORAL at 05:41

## 2020-12-09 RX ADMIN — Medication 0.5 MILLIGRAM(S): at 21:09

## 2020-12-09 RX ADMIN — Medication 0.5 MILLIGRAM(S): at 02:19

## 2020-12-09 RX ADMIN — Medication 1 TABLET(S): at 05:41

## 2020-12-09 RX ADMIN — ATORVASTATIN CALCIUM 10 MILLIGRAM(S): 80 TABLET, FILM COATED ORAL at 21:08

## 2020-12-09 RX ADMIN — Medication 1 DROP(S): at 11:49

## 2020-12-09 RX ADMIN — TAMSULOSIN HYDROCHLORIDE 0.4 MILLIGRAM(S): 0.4 CAPSULE ORAL at 21:08

## 2020-12-09 RX ADMIN — POLYETHYLENE GLYCOL 3350 17 GRAM(S): 17 POWDER, FOR SOLUTION ORAL at 17:19

## 2020-12-09 RX ADMIN — PANTOPRAZOLE SODIUM 40 MILLIGRAM(S): 20 TABLET, DELAYED RELEASE ORAL at 05:41

## 2020-12-09 RX ADMIN — OXYCODONE HYDROCHLORIDE 5 MILLIGRAM(S): 5 TABLET ORAL at 11:40

## 2020-12-09 RX ADMIN — OXYCODONE HYDROCHLORIDE 5 MILLIGRAM(S): 5 TABLET ORAL at 03:06

## 2020-12-09 RX ADMIN — Medication 4 MILLIGRAM(S): at 11:49

## 2020-12-09 RX ADMIN — Medication 100 MILLIGRAM(S): at 05:40

## 2020-12-09 RX ADMIN — POLYETHYLENE GLYCOL 3350 17 GRAM(S): 17 POWDER, FOR SOLUTION ORAL at 11:49

## 2020-12-09 NOTE — PHYSICAL THERAPY INITIAL EVALUATION ADULT - ADDITIONAL COMMENTS
PTA pt was independent with functional mobility and ADLs, ambulating occasionally with Straight cane.

## 2020-12-09 NOTE — DISCHARGE NOTE PROVIDER - NSDCACTIVITY_GEN_ALL_CORE
No heavy lifting/straining/Walking - Indoors allowed/Stairs allowed/Walking - Outdoors allowed/Showering allowed/Do not drive or operate machinery

## 2020-12-09 NOTE — DISCHARGE NOTE PROVIDER - NSDCFUADDAPPT_GEN_ALL_CORE_FT
Please make an appointment to see Dr Maloney in 2 weeks for follow up, please call His office for an appointment Please make an appointment to see Dr Maloney in 1 week for follow up, please call His office for an appointment

## 2020-12-09 NOTE — DISCHARGE NOTE PROVIDER - NSRESEARCHGRANT_OVERRIDEREC_GEN_A_CORE
IMPROVE-DD Application Not Available IMPROVE-DD Application Not Available/This is a surgical and/or non-medical patient.

## 2020-12-09 NOTE — DISCHARGE NOTE PROVIDER - NSDCFUADDINST_GEN_ALL_CORE_FT
please keep incision clean and dry, do not submerge wound in water for prolonged periods of time, may shower in 3 days, pat dry after showering, and do not use any creams or ointments to incision.  please do not engage in strenuous activity, heavy lifting, drive, or return to work or school until cleared by surgeon.  please notify physician if fevers, bleeding, swelling, pain not relieved by medication, increased sluggishness or irritability, increased nausea or vomiting, inability to tolerate foods or liquids.

## 2020-12-09 NOTE — PROGRESS NOTE ADULT - SUBJECTIVE AND OBJECTIVE BOX
Patient seen and examined at bedside.    --Anticoagulation--    T(C): 36.3 (12-09-20 @ 00:00), Max: 36.8 (12-08-20 @ 09:39)  HR: 59 (12-09-20 @ 00:00) (57 - 78)  BP: 146/87 (12-09-20 @ 00:00) (122/73 - 162/77)  RR: 16 (12-09-20 @ 00:00) (16 - 20)  SpO2: 97% (12-09-20 @ 00:00) (95% - 98%)  Wt(kg): --    Exam:  AOx3, FC, PERRL, EOMI, no facial, 5/5 throughout, no drift

## 2020-12-09 NOTE — PROVIDER CONTACT NOTE (OTHER) - ASSESSMENT
Pt is strong neurologically, no drifts present, pupils are 3mm B/L. Pt has no c/o of numbness or tingling in the upper extremities. Pulses are present in all extremities.

## 2020-12-09 NOTE — PROVIDER CONTACT NOTE (OTHER) - ACTION/TREATMENT ORDERED:
As per provider no interventions necessary at this time. Safety maintained. Bed alarm active. Will continue to monitor.

## 2020-12-09 NOTE — DISCHARGE NOTE PROVIDER - HOSPITAL COURSE
64 yo male with h/o HTN, Hyperlipidemia, obesity, chronic back problems for many years, developed shoulder pain radiating to BUE about 3 mos ago, MRI and CT scan revealed herniated disc and spinal stenosis. Now for surgical intervention.  65y Male Post-op day # 1 s/p C3-6 ACDF 66 yo male with h/o HTN, Hyperlipidemia, obesity, chronic back problems for many years, developed shoulder pain radiating to BUE about 3 mos ago, MRI and CT scan revealed herniated disc and spinal stenosis. Now for surgical intervention.  65y Male Post-op day # 2 s/p C3-6 ACDF patient is clinically doing well. Post-op cervical MRI revealed  Improved appearance of spinal canal stenosis at C4-C5.  Severe spinal canal stenosis again noted at C3-C4 as well as at C5-C6 where there is ventral cord indentation as described.  The report was discussed with patient with risks and benefits of second stage surgery ( C2-T2 posterior fusion) ; he opted not to have surgery at this time.   Physical therapy evaluated him and out patient therapy was recommended. Patient condition is stable for discharge

## 2020-12-09 NOTE — DISCHARGE NOTE PROVIDER - NSDCCPTREATMENT_GEN_ALL_CORE_FT
PRINCIPAL PROCEDURE  Procedure: Anterior cervical discectomy and fusion (ACDF)  Findings and Treatment: C3-6 Level  Will d/c home today with out patient PT

## 2020-12-09 NOTE — PROGRESS NOTE ADULT - SUBJECTIVE AND OBJECTIVE BOX
Post-op day # 1 s/p C3-6 ACDF    OVERNIGHT EVENTS:  Vital Signs Last 24 Hrs  T(C): 36.7 (09 Dec 2020 04:00), Max: 36.8 (08 Dec 2020 09:39)  T(F): 98.1 (09 Dec 2020 04:00), Max: 98.3 (09 Dec 2020 01:04)  HR: 65 (09 Dec 2020 06:19) (57 - 78)  BP: 147/88 (09 Dec 2020 06:19) (122/73 - 162/77)  BP(mean): 112 (09 Dec 2020 00:00) (102 - 115)  RR: 16 (09 Dec 2020 04:00) (16 - 20)  SpO2: 94% (09 Dec 2020 04:00) (94% - 98%)    I&O's Detail    08 Dec 2020 07:01  -  09 Dec 2020 07:00  --------------------------------------------------------  IN:    Lactated Ringers: 600 mL    Oral Fluid: 810 mL  Total IN: 1410 mL    OUT:    Accordian (mL): 75 mL    Indwelling Catheter - Urethral (mL): 2900 mL  Total OUT: 2975 mL    Total NET: -1565 mL        I&O's Summary    08 Dec 2020 07:01  -  09 Dec 2020 07:00  --------------------------------------------------------  IN: 1410 mL / OUT: 2975 mL / NET: -1565 mL        PHYSICAL EXAM:  Neurological:    Motor exam:         [] Upper extremity                 D             B          T          WE       WF      HI                                               R         5/5        5/5        5/5       5/5     5/5       5/5                                               L          5/5        5/5        5/5       5/5     5/5       5/5         [] Lower extremity                Ps          Ha        Quad    EHL        FHL                                               R        5/5        5/5        5/5       5/5         5/5                                               L         5/5        5/5       5/5       5/5          5/5                                                        [] warm well perfused; capillary refill <3 seconds     Sensation: [] intact to light touch  [] decreased:       Gait    Cardiovascular: Regular  Respiratory: Clear bilaterally  Gastrointestinal: Abd soft + BS non tender  Genitourinary:  Extremities: -C/C/E  Incision/Wound: Intact    TUBES/LINES:  [] CVC  [] A-line  [] Lumbar Drain  [] Ventriculostomy  [] Other    DIET:  [] NPO  [] Mechanical  [] Tube feeds    LABS:                        15.6   6.71  )-----------( 228      ( 09 Dec 2020 07:21 )             46.6     12-09    140  |  98  |  12  ----------------------------<  120<H>  3.3<L>   |  26  |  0.49<L>    Ca    9.4      09 Dec 2020 06:50    TPro  x   /  Alb  x   /  TBili  0.6  /  DBili  x   /  AST  x   /  ALT  x   /  AlkPhos  x   12-09            CAPILLARY BLOOD GLUCOSE              Drug Levels: [] N/A    CSF Analysis: [] N/A      Allergies    No Known Allergies    Intolerances      MEDICATIONS:  Antibiotics:  ceFAZolin   IVPB 3000 milliGRAM(s) IV Intermittent once    Neuro:  ALPRAZolam 0.5 milliGRAM(s) Oral at bedtime PRN  methocarbamol 750 milliGRAM(s) Oral three times a day  ondansetron Injectable 4 milliGRAM(s) IV Push every 6 hours PRN  oxyCODONE    IR 5 milliGRAM(s) Oral every 3 hours PRN    Anticoagulation:    OTHER:  artificial  tears Solution 1 Drop(s) Both EYES every 2 hours PRN  atorvastatin 10 milliGRAM(s) Oral at bedtime  dexAMETHasone  Injectable 4 milliGRAM(s) IV Push every 6 hours  dicyclomine 20 milliGRAM(s) Oral two times a day before meals  lisinopril 40 milliGRAM(s) Oral daily  magnesium hydroxide Suspension 30 milliLiter(s) Oral every 12 hours PRN  pantoprazole    Tablet 40 milliGRAM(s) Oral before breakfast  polyethylene glycol 3350 17 Gram(s) Oral daily  senna 2 Tablet(s) Oral at bedtime  tamsulosin 0.4 milliGRAM(s) Oral at bedtime  triamterene 37.5 mG/hydrochlorothiazide 25 mG Tablet 1 Tablet(s) Oral daily    IVF:  lactated ringers. 1000 milliLiter(s) IV Continuous <Continuous>    CULTURES:    RADIOLOGY & ADDITIONAL TESTS:      ASSESSMENT:     66 yo male with h/o HTN, Hyperlipidemia, obesity, chronic back problems for many years, developed shoulder pain radiating to BUE about 3 mos ago, MRI and CT scan revealed herniated disc and spinal stenosis. Now for surgical intervention.     [facility]     COVID Britany/ no hospitalizations (24 Nov 2020 07:08)    65y Male Post-op day # 1 s/p C3-6 ACDF  PLAN:  NEURO:    CARDIOVASCULAR:  Hemodynamically stable    PULMONARY:  Keep O2 sat > 94%  Continue incentive spirometer    RENAL:  Voidind  IVL    GI:  Tolerating diet  Bowel regimen    HEME:  H/H stable    ID: Afebrile    ENDO:    DVT PROPHYLAXIS:  [x] Venodynes                                [x] Heparin/Lovenox    FALL RISK:  [x] Low Risk                                    [] Impulsive Post-op day # 1 s/p C3-6 ACDF    OVERNIGHT EVENTS: no acute event, patient stated she has no neck pain , his paresthesia has considerably decreased    Vital Signs Last 24 Hrs  T(C): 36.7 (09 Dec 2020 04:00), Max: 36.8 (08 Dec 2020 09:39)  T(F): 98.1 (09 Dec 2020 04:00), Max: 98.3 (09 Dec 2020 01:04)  HR: 65 (09 Dec 2020 06:19) (57 - 78)  BP: 147/88 (09 Dec 2020 06:19) (122/73 - 162/77)  BP(mean): 112 (09 Dec 2020 00:00) (102 - 115)  RR: 16 (09 Dec 2020 04:00) (16 - 20)  SpO2: 94% (09 Dec 2020 04:00) (94% - 98%)    I&O's Detail    08 Dec 2020 07:01  -  09 Dec 2020 07:00  --------------------------------------------------------  IN:    Lactated Ringers: 600 mL    Oral Fluid: 810 mL  Total IN: 1410 mL    OUT:    Accordian (mL): 75 mL    Indwelling Catheter - Urethral (mL): 2900 mL  Total OUT: 2975 mL    Total NET: -1565 mL        I&O's Summary    08 Dec 2020 07:01  -  09 Dec 2020 07:00  --------------------------------------------------------  IN: 1410 mL / OUT: 2975 mL / NET: -1565 mL        PHYSICAL EXAM:  Neurological:    Motor exam:         [x] Upper extremity                 D             B          T          WE       WF      HI                                               R         5/5        5/5        5/5       5/5     5/5       5/5                                               L          5/5        5/5        5/5       5/5     5/5       5/5         [x] Lower extremity                Ps          Ha        Quad    EHL        FHL                                               R        5/5        5/5        5/5       5/5         5/5                                               L         5/5        5/5       5/5       5/5          5/5                                                           Sensation: [] intact to light touch  [x] decreased: ( fingertip but much better as compare to pre-op)      Gait: not tested    Cardiovascular: Regular  Respiratory: Clear bilaterally  Gastrointestinal: Abd soft + BS non tender  Genitourinary:  Extremities: -C/C/E  Incision/Wound: Intact    TUBES/LINES:  [] CVC  [] A-line  [] Lumbar Drain  [] Ventriculostomy  [] Other    DIET: Regular  [] NPO  [] Mechanical  [] Tube feeds    LABS:                        15.6   6.71  )-----------( 228      ( 09 Dec 2020 07:21 )             46.6     12-09    140  |  98  |  12  ----------------------------<  120<H>  3.3<L>   |  26  |  0.49<L>    Ca    9.4      09 Dec 2020 06:50    TPro  x   /  Alb  x   /  TBili  0.6  /  DBili  x   /  AST  x   /  ALT  x   /  AlkPhos  x   12-09      Allergies    No Known Allergies    Intolerances      MEDICATIONS:  Antibiotics:  ceFAZolin   IVPB 3000 milliGRAM(s) IV Intermittent once    Neuro:  ALPRAZolam 0.5 milliGRAM(s) Oral at bedtime PRN  methocarbamol 750 milliGRAM(s) Oral three times a day  ondansetron Injectable 4 milliGRAM(s) IV Push every 6 hours PRN  oxyCODONE    IR 5 milliGRAM(s) Oral every 3 hours PRN    Anticoagulation:    OTHER:  artificial  tears Solution 1 Drop(s) Both EYES every 2 hours PRN  atorvastatin 10 milliGRAM(s) Oral at bedtime  dexAMETHasone  Injectable 4 milliGRAM(s) IV Push every 6 hours  dicyclomine 20 milliGRAM(s) Oral two times a day before meals  lisinopril 40 milliGRAM(s) Oral daily  magnesium hydroxide Suspension 30 milliLiter(s) Oral every 12 hours PRN  pantoprazole    Tablet 40 milliGRAM(s) Oral before breakfast  polyethylene glycol 3350 17 Gram(s) Oral daily  senna 2 Tablet(s) Oral at bedtime  tamsulosin 0.4 milliGRAM(s) Oral at bedtime  triamterene 37.5 mG/hydrochlorothiazide 25 mG Tablet 1 Tablet(s) Oral daily    IVF:  lactated ringers. 1000 milliLiter(s) IV Continuous <Continuous>    CULTURES:    RADIOLOGY & ADDITIONAL TESTS:      ASSESSMENT:     66 yo male with h/o HTN, Hyperlipidemia, obesity, chronic back problems for many years, developed shoulder pain radiating to BUE about 3 mos ago, MRI and CT scan revealed herniated disc and spinal stenosis. Now for surgical intervention.     COVID swab 12/5 [facility]     COVID Britany/ no hospitalizations (24 Nov 2020 07:08)    65y Male Post-op day # 1 s/p C3-6 ACDF    PLAN:  NEURO: Stable, Continue HMV, f/u cervical spine MRI pend, Analgesic prn, PT consult, increase activity as tolerated    CARDIOVASCULAR:  Hemodynamically stable on Maxide and Lisinopril    PULMONARY:  Keep O2 sat > 94%  Continue incentive spirometer    RENAL:  Voiding  Hypokalemia, K+ supplemented will check f/u in am  IVL    GI:  Tolerating diet  Bowel regimen: Miralax and Senna    HEME:  H/H stable    ID: Afebrile    ENDO: No issue    DVT PROPHYLAXIS:  [x] Venodynes                                [x] Heparin/Lovenox    FALL RISK:  [x] Low Risk                                    [] Impulsive

## 2020-12-09 NOTE — DISCHARGE NOTE PROVIDER - NSDCMRMEDTOKEN_GEN_ALL_CORE_FT
ALPRAZolam 0.5 mg oral tablet: 1 tab(s) orally 3 times a day, As Needed  aspirin 81 mg oral tablet: orally once a day  clonazePAM 0.5 mg oral tablet: 1 tab(s) orally 3 times a day, As Needed  dicyclomine 20 mg oral tablet: 1 tab(s) orally 2 times a day  Flomax 0.4 mg oral capsule: 1 cap(s) orally once a day  Lipitor 10 mg oral tablet: 1 tab(s) orally once a day  omeprazole 40 mg oral delayed release capsule: 1 cap(s) orally once a day  oxyCODONE 5 mg oral tablet: 1 tab(s) orally every 4 to 6 hours, As Needed -Moderate Pain (4 - 6) MDD:6  Percocet 5 mg-325 mg oral tablet: 1 tab(s) orally every 6 hours  quinapril 20 mg oral tablet: 1 tab(s) orally 2 times a day  triamterene-hydrochlorothiazide 37.5 mg-25 mg oral tablet: 1 tab(s) orally once a day   ALPRAZolam 0.5 mg oral tablet: 1 tab(s) orally 3 times a day, As Needed  clonazePAM 0.5 mg oral tablet: 1 tab(s) orally 3 times a day, As Needed  dicyclomine 20 mg oral tablet: 1 tab(s) orally 2 times a day  Flomax 0.4 mg oral capsule: 1 cap(s) orally once a day  Lipitor 10 mg oral tablet: 1 tab(s) orally once a day  magnesium hydroxide 8% oral suspension: 30 milliliter(s) orally every 12 hours, As needed, Constipation  methocarbamol 750 mg oral tablet: 1 tab(s) orally 3 times a day MDD:3  ocular lubricant ophthalmic solution: 1 drop(s) to each affected eye every 2 hours, As needed, Dry Eyes  omeprazole 40 mg oral delayed release capsule: 1 cap(s) orally once a day  oxyCODONE 5 mg oral tablet: 1 tab(s) orally every 4 to 6 hours, As Needed -Moderate Pain (4 - 6) MDD:6  Percocet 5 mg-325 mg oral tablet: 1 tab(s) orally every 6 hours  polyethylene glycol 3350 oral powder for reconstitution: 17 gram(s) orally 2 times a day  quinapril 20 mg oral tablet: 1 tab(s) orally 2 times a day  senna oral tablet: 2 tab(s) orally once a day (at bedtime)  triamterene-hydrochlorothiazide 37.5 mg-25 mg oral tablet: 1 tab(s) orally once a day   ALPRAZolam 0.5 mg oral tablet: 1 tab(s) orally 3 times a day, As Needed  clonazePAM 0.5 mg oral tablet: 1 tab(s) orally 3 times a day, As Needed  dicyclomine 20 mg oral tablet: 1 tab(s) orally 2 times a day  Flomax 0.4 mg oral capsule: 1 cap(s) orally once a day  Lipitor 10 mg oral tablet: 1 tab(s) orally once a day  magnesium hydroxide 8% oral suspension: 30 milliliter(s) orally every 12 hours, As needed, Constipation  Medrol Dosepak 4 mg oral tablet: As directed  methocarbamol 750 mg oral tablet: 1 tab(s) orally 3 times a day MDD:3  ocular lubricant ophthalmic solution: 1 drop(s) to each affected eye every 2 hours, As needed, Dry Eyes  omeprazole 40 mg oral delayed release capsule: 1 cap(s) orally once a day  oxyCODONE 5 mg oral tablet: 1 tab(s) orally every 4 to 6 hours, As Needed -Moderate Pain (4 - 6) MDD:6  Patient is s/p C3-6 ACDF please evaluate for out patient PT:   Percocet 5 mg-325 mg oral tablet: 1 tab(s) orally every 6 hours  polyethylene glycol 3350 oral powder for reconstitution: 17 gram(s) orally 2 times a day  quinapril 20 mg oral tablet: 1 tab(s) orally 2 times a day  senna oral tablet: 2 tab(s) orally once a day (at bedtime)  triamterene-hydrochlorothiazide 37.5 mg-25 mg oral tablet: 1 tab(s) orally once a day

## 2020-12-09 NOTE — PHYSICAL THERAPY INITIAL EVALUATION ADULT - PERTINENT HX OF CURRENT PROBLEM, REHAB EVAL
Pt is a 66 yo male with h/o HTN, Hyperlipidemia, obesity, chronic back problems for many years, developed shoulder pain radiating to BUE about 3 mos ago, MRI and CT scan revealed herniated disc and spinal stenosis. Pt is now s/p C3-6 ACDF.

## 2020-12-09 NOTE — PROVIDER CONTACT NOTE (OTHER) - BACKGROUND
Pt was admitted for a C3-C6 ACDF. As per patient numbness has been present prior to this admission in his B/L LE.

## 2020-12-09 NOTE — DISCHARGE NOTE PROVIDER - NSDCCPCAREPLAN_GEN_ALL_CORE_FT
PRINCIPAL DISCHARGE DIAGNOSIS  Diagnosis: Cervical spinal stenosis  Assessment and Plan of Treatment:        PRINCIPAL DISCHARGE DIAGNOSIS  Diagnosis: Cervical spinal stenosis  Assessment and Plan of Treatment: S/p C3-6 ACDF  Will d/c home today with out patient PT      SECONDARY DISCHARGE DIAGNOSES  Diagnosis: Hyperlipidemia  Assessment and Plan of Treatment: Continue Lipitor    Diagnosis: BPH (benign prostatic hyperplasia)  Assessment and Plan of Treatment: Continue Flomax and follow up with your Doctor or Urologist    Diagnosis: Anxiety  Assessment and Plan of Treatment: Continue the Alprazolam  or Clonazepam as needed    Diagnosis: HTN (hypertension)  Assessment and Plan of Treatment: Continue Quinapril and Triamterene/HCTZ as before and follow up with your primary care physician    Diagnosis: Chronic GERD  Assessment and Plan of Treatment: Continue Omeprazole

## 2020-12-09 NOTE — DISCHARGE NOTE PROVIDER - NSRESEARCHGRANT_PROPHYLAXISRECOMFT_GEN_A_CORE
IMPROVE-DD Application Not Available This is a surgical and/or non-medical patient.; IMPROVE-DD Application Not Available

## 2020-12-09 NOTE — DISCHARGE NOTE PROVIDER - CARE PROVIDER_API CALL
Amador Maloney  NEUROSURGERY  [address] [place] [address], [facility] [address]  [place] [address]  Phone: [phone]  Fax: [phone]  Follow Up Time:

## 2020-12-10 ENCOUNTER — TRANSCRIPTION ENCOUNTER (OUTPATIENT)
Age: 65
End: 2020-12-10

## 2020-12-10 VITALS
TEMPERATURE: 98 F | SYSTOLIC BLOOD PRESSURE: 132 MMHG | OXYGEN SATURATION: 97 % | DIASTOLIC BLOOD PRESSURE: 85 MMHG | HEART RATE: 66 BPM | RESPIRATION RATE: 18 BRPM

## 2020-12-10 LAB
ANION GAP SERPL CALC-SCNC: 12 MMOL/L — SIGNIFICANT CHANGE UP (ref 5–17)
BASOPHILS # BLD AUTO: 0.01 K/UL — SIGNIFICANT CHANGE UP (ref 0–0.2)
BASOPHILS NFR BLD AUTO: 0.1 % — SIGNIFICANT CHANGE UP (ref 0–2)
BUN SERPL-MCNC: 17 MG/DL — SIGNIFICANT CHANGE UP (ref 7–23)
CALCIUM SERPL-MCNC: 9.2 MG/DL — SIGNIFICANT CHANGE UP (ref 8.4–10.5)
CHLORIDE SERPL-SCNC: 100 MMOL/L — SIGNIFICANT CHANGE UP (ref 96–108)
CO2 SERPL-SCNC: 26 MMOL/L — SIGNIFICANT CHANGE UP (ref 22–31)
CREAT SERPL-MCNC: 0.56 MG/DL — SIGNIFICANT CHANGE UP (ref 0.5–1.3)
EOSINOPHIL # BLD AUTO: 0.17 K/UL — SIGNIFICANT CHANGE UP (ref 0–0.5)
EOSINOPHIL NFR BLD AUTO: 2 % — SIGNIFICANT CHANGE UP (ref 0–6)
GLUCOSE SERPL-MCNC: 131 MG/DL — HIGH (ref 70–99)
HCT VFR BLD CALC: 46.5 % — SIGNIFICANT CHANGE UP (ref 39–50)
HGB BLD-MCNC: 15.7 G/DL — SIGNIFICANT CHANGE UP (ref 13–17)
IMM GRANULOCYTES NFR BLD AUTO: 0.1 % — SIGNIFICANT CHANGE UP (ref 0–1.5)
LYMPHOCYTES # BLD AUTO: 0.68 K/UL — LOW (ref 1–3.3)
LYMPHOCYTES # BLD AUTO: 8 % — LOW (ref 13–44)
MCHC RBC-ENTMCNC: 31.5 PG — SIGNIFICANT CHANGE UP (ref 27–34)
MCHC RBC-ENTMCNC: 33.8 GM/DL — SIGNIFICANT CHANGE UP (ref 32–36)
MCV RBC AUTO: 93.4 FL — SIGNIFICANT CHANGE UP (ref 80–100)
MONOCYTES # BLD AUTO: 0.68 K/UL — SIGNIFICANT CHANGE UP (ref 0–0.9)
MONOCYTES NFR BLD AUTO: 8 % — SIGNIFICANT CHANGE UP (ref 2–14)
NEUTROPHILS # BLD AUTO: 6.91 K/UL — SIGNIFICANT CHANGE UP (ref 1.8–7.4)
NEUTROPHILS NFR BLD AUTO: 81.8 % — HIGH (ref 43–77)
NRBC # BLD: 0 /100 WBCS — SIGNIFICANT CHANGE UP (ref 0–0)
PLATELET # BLD AUTO: 237 K/UL — SIGNIFICANT CHANGE UP (ref 150–400)
POTASSIUM SERPL-MCNC: 3.9 MMOL/L — SIGNIFICANT CHANGE UP (ref 3.5–5.3)
POTASSIUM SERPL-SCNC: 3.9 MMOL/L — SIGNIFICANT CHANGE UP (ref 3.5–5.3)
RBC # BLD: 4.98 M/UL — SIGNIFICANT CHANGE UP (ref 4.2–5.8)
RBC # FLD: 13.2 % — SIGNIFICANT CHANGE UP (ref 10.3–14.5)
SODIUM SERPL-SCNC: 138 MMOL/L — SIGNIFICANT CHANGE UP (ref 135–145)
WBC # BLD: 8.46 K/UL — SIGNIFICANT CHANGE UP (ref 3.8–10.5)
WBC # FLD AUTO: 8.46 K/UL — SIGNIFICANT CHANGE UP (ref 3.8–10.5)

## 2020-12-10 RX ORDER — ALPRAZOLAM 0.25 MG
0.5 TABLET ORAL ONCE
Refills: 0 | Status: DISCONTINUED | OUTPATIENT
Start: 2020-12-10 | End: 2020-12-10

## 2020-12-10 RX ORDER — ASPIRIN/CALCIUM CARB/MAGNESIUM 324 MG
0 TABLET ORAL
Qty: 0 | Refills: 0 | DISCHARGE

## 2020-12-10 RX ORDER — METHOCARBAMOL 500 MG/1
1 TABLET, FILM COATED ORAL
Qty: 90 | Refills: 0
Start: 2020-12-10 | End: 2021-01-08

## 2020-12-10 RX ORDER — POLYETHYLENE GLYCOL 3350 17 G/17G
17 POWDER, FOR SOLUTION ORAL
Qty: 0 | Refills: 0 | DISCHARGE
Start: 2020-12-10

## 2020-12-10 RX ORDER — SENNA PLUS 8.6 MG/1
2 TABLET ORAL
Qty: 0 | Refills: 0 | DISCHARGE
Start: 2020-12-10

## 2020-12-10 RX ORDER — DEXAMETHASONE 0.5 MG/5ML
4 ELIXIR ORAL EVERY 6 HOURS
Refills: 0 | Status: DISCONTINUED | OUTPATIENT
Start: 2020-12-10 | End: 2020-12-10

## 2020-12-10 RX ORDER — MAGNESIUM HYDROXIDE 400 MG/1
30 TABLET, CHEWABLE ORAL
Qty: 0 | Refills: 0 | DISCHARGE
Start: 2020-12-10

## 2020-12-10 RX ADMIN — Medication 0.5 MILLIGRAM(S): at 03:50

## 2020-12-10 RX ADMIN — PANTOPRAZOLE SODIUM 40 MILLIGRAM(S): 20 TABLET, DELAYED RELEASE ORAL at 06:41

## 2020-12-10 RX ADMIN — METHOCARBAMOL 750 MILLIGRAM(S): 500 TABLET, FILM COATED ORAL at 06:41

## 2020-12-10 RX ADMIN — Medication 1 TABLET(S): at 06:41

## 2020-12-10 RX ADMIN — OXYCODONE HYDROCHLORIDE 5 MILLIGRAM(S): 5 TABLET ORAL at 15:15

## 2020-12-10 RX ADMIN — LISINOPRIL 40 MILLIGRAM(S): 2.5 TABLET ORAL at 06:41

## 2020-12-10 RX ADMIN — Medication 4 MILLIGRAM(S): at 13:54

## 2020-12-10 RX ADMIN — OXYCODONE HYDROCHLORIDE 5 MILLIGRAM(S): 5 TABLET ORAL at 18:14

## 2020-12-10 RX ADMIN — Medication 1 DROP(S): at 16:04

## 2020-12-10 RX ADMIN — METHOCARBAMOL 750 MILLIGRAM(S): 500 TABLET, FILM COATED ORAL at 13:08

## 2020-12-10 RX ADMIN — OXYCODONE HYDROCHLORIDE 5 MILLIGRAM(S): 5 TABLET ORAL at 17:42

## 2020-12-10 RX ADMIN — OXYCODONE HYDROCHLORIDE 5 MILLIGRAM(S): 5 TABLET ORAL at 14:44

## 2020-12-10 RX ADMIN — Medication 20 MILLIGRAM(S): at 06:41

## 2020-12-10 RX ADMIN — Medication 20 MILLIGRAM(S): at 16:06

## 2020-12-10 NOTE — PROGRESS NOTE ADULT - SUBJECTIVE AND OBJECTIVE BOX
Post-op day # 1 s/p C3-6 ACDF    OVERNIGHT EVENTS: doing well would like to go home    Vital Signs Last 24 Hrs  T(C): 36.6 (10 Dec 2020 11:37), Max: 36.9 (09 Dec 2020 14:45)  T(F): 97.8 (10 Dec 2020 11:37), Max: 98.4 (09 Dec 2020 14:45)  HR: 66 (10 Dec 2020 11:37) (51 - 69)  BP: 132/85 (10 Dec 2020 11:37) (125/81 - 154/78)  BP(mean): --  RR: 18 (10 Dec 2020 11:37) (18 - 18)  SpO2: 97% (10 Dec 2020 11:37) (93% - 97%)    I&O's Detail    09 Dec 2020 07:01  -  10 Dec 2020 07:00  --------------------------------------------------------  IN:    Lactated Ringers: 500 mL    Oral Fluid: 2280 mL  Total IN: 2780 mL    OUT:    Accordian (mL): 15 mL    Indwelling Catheter - Urethral (mL): 550 mL    Voided (mL): 1525 mL  Total OUT: 2090 mL    Total NET: 690 mL      10 Dec 2020 07:01  -  10 Dec 2020 11:57  --------------------------------------------------------  IN:    Oral Fluid: 280 mL  Total IN: 280 mL    OUT:  Total OUT: 0 mL    Total NET: 280 mL        I&O's Summary    09 Dec 2020 07:01  -  10 Dec 2020 07:00  --------------------------------------------------------  IN: 2780 mL / OUT: 2090 mL / NET: 690 mL    10 Dec 2020 07:01  -  10 Dec 2020 11:57  --------------------------------------------------------  IN: 280 mL / OUT: 0 mL / NET: 280 mL        PHYSICAL EXAM:  Neurological:    Motor exam:         [x] Upper extremity                 D             B          T          WE       WF      HI                                               R         5/5        5/5        5/5       5/5     5/5       5/5                                               L          5/5        5/5        5/5       5/5     5/5       5/5         [x] Lower extremity                Ps          Ha        Quad    EHL        FHL                                               R        5/5        5/5        5/5       5/5         5/5                                               L         5/5        5/5       5/5       5/5          5/5                     Sensation: [x] intact to light touch  [] decreased:       Gait intact    Cardiovascular: Regular  Respiratory: Clear bilaterally  Gastrointestinal: Abd soft + BS non tender  Genitourinary:  Extremities: -C/C/E  Incision/Wound: Intact, HMV removed, new dressing applied    TUBES/LINES:  [] CVC  [] A-line  [] Lumbar Drain  [] Ventriculostomy  [] Other    DIET: Regular  [] NPO  [] Mechanical  [] Tube feeds    LABS:                        15.7   8.46  )-----------( 237      ( 10 Dec 2020 05:58 )             46.5     12-10    138  |  100  |  17  ----------------------------<  131<H>  3.9   |  26  |  0.56    Ca    9.2      10 Dec 2020 05:54    TPro  x   /  Alb  x   /  TBili  0.6  /  DBili  x   /  AST  x   /  ALT  x   /  AlkPhos  x   12-09                 Allergies    No Known Allergies    Intolerances      MEDICATIONS:  Antibiotics:  ceFAZolin   IVPB 3000 milliGRAM(s) IV Intermittent once    Neuro:  ALPRAZolam 0.5 milliGRAM(s) Oral at bedtime PRN  methocarbamol 750 milliGRAM(s) Oral three times a day  ondansetron Injectable 4 milliGRAM(s) IV Push every 6 hours PRN  oxyCODONE    IR 5 milliGRAM(s) Oral every 3 hours PRN    Anticoagulation:    OTHER:  artificial  tears Solution 1 Drop(s) Both EYES every 2 hours PRN  atorvastatin 10 milliGRAM(s) Oral at bedtime  dicyclomine 20 milliGRAM(s) Oral two times a day before meals  lisinopril 40 milliGRAM(s) Oral daily  magnesium hydroxide Suspension 30 milliLiter(s) Oral every 12 hours PRN  pantoprazole    Tablet 40 milliGRAM(s) Oral before breakfast  polyethylene glycol 3350 17 Gram(s) Oral two times a day  senna 2 Tablet(s) Oral at bedtime  tamsulosin 0.4 milliGRAM(s) Oral at bedtime  triamterene 37.5 mG/hydrochlorothiazide 25 mG Tablet 1 Tablet(s) Oral daily    IVF:    CULTURES:    RADIOLOGY & ADDITIONAL TESTS:< from: MR Cervical Spine No Cont (12.09.20 @ 18:05) >  IMPRESSION:    -Status post ACDF spanning C3-C6.    -Improved appearance of spinal canal stenosis at C4-C5.    -Severe spinal canal stenosis again noted at C3-C4 as well as at C5-C6 where there is ventral cord indentation as described.             ASSESSMENT:     66 yo male with h/o HTN, Hyperlipidemia, obesity, chronic back problems for many years, developed shoulder pain radiating to BUE about 3 mos ago, MRI and CT scan revealed herniated disc and spinal stenosis. Now for surgical intervention.     65y Male Post-op day # 1 s/p C3-6 ACDF    PLAN:  NEURO: Stable, HMV d/cherie new dressind applied, cervical spine MRI report noted, Dr Maloney will discuss study with patient may need 2nd stage sx    CARDIOVASCULAR:  Hemodynamically stable on Maxide and Lisinopril    PULMONARY:  Keep O2 sat > 94%  Continue incentive spirometer    RENAL:  Voiding, k 3.9 today  IVL    GI:  Tolerating diet  Bowel regimen: Miralax and Senna    HEME:  H/H stable    ID: Afebrile    ENDO: No issue    DVT PROPHYLAXIS:  [x] Venodynes                                [x] Heparin/Lovenox    FALL RISK:  [x] Low Risk                                    [] Impulsive          Post-op day # 2 s/p C3-6 ACDF    OVERNIGHT EVENTS: doing well would like to go home    Vital Signs Last 24 Hrs  T(C): 36.6 (10 Dec 2020 11:37), Max: 36.9 (09 Dec 2020 14:45)  T(F): 97.8 (10 Dec 2020 11:37), Max: 98.4 (09 Dec 2020 14:45)  HR: 66 (10 Dec 2020 11:37) (51 - 69)  BP: 132/85 (10 Dec 2020 11:37) (125/81 - 154/78)  BP(mean): --  RR: 18 (10 Dec 2020 11:37) (18 - 18)  SpO2: 97% (10 Dec 2020 11:37) (93% - 97%)    I&O's Detail    09 Dec 2020 07:01  -  10 Dec 2020 07:00  --------------------------------------------------------  IN:    Lactated Ringers: 500 mL    Oral Fluid: 2280 mL  Total IN: 2780 mL    OUT:    Accordian (mL): 15 mL    Indwelling Catheter - Urethral (mL): 550 mL    Voided (mL): 1525 mL  Total OUT: 2090 mL    Total NET: 690 mL      10 Dec 2020 07:01  -  10 Dec 2020 11:57  --------------------------------------------------------  IN:    Oral Fluid: 280 mL  Total IN: 280 mL    OUT:  Total OUT: 0 mL    Total NET: 280 mL        I&O's Summary    09 Dec 2020 07:01  -  10 Dec 2020 07:00  --------------------------------------------------------  IN: 2780 mL / OUT: 2090 mL / NET: 690 mL    10 Dec 2020 07:01  -  10 Dec 2020 11:57  --------------------------------------------------------  IN: 280 mL / OUT: 0 mL / NET: 280 mL        PHYSICAL EXAM:  Neurological:    Motor exam:         [x] Upper extremity                 D             B          T          WE       WF      HI                                               R         5/5        5/5        5/5       5/5     5/5       5/5                                               L          5/5        5/5        5/5       5/5     5/5       5/5         [x] Lower extremity                Ps          Ha        Quad    EHL        FHL                                               R        5/5        5/5        5/5       5/5         5/5                                               L         5/5        5/5       5/5       5/5          5/5                     Sensation: [x] intact to light touch  [] decreased:       Gait intact    Cardiovascular: Regular  Respiratory: Clear bilaterally  Gastrointestinal: Abd soft + BS non tender  Genitourinary:  Extremities: -C/C/E  Incision/Wound: Intact, HMV removed, new dressing applied    TUBES/LINES:  [] CVC  [] A-line  [] Lumbar Drain  [] Ventriculostomy  [] Other    DIET: Regular  [] NPO  [] Mechanical  [] Tube feeds    LABS:                        15.7   8.46  )-----------( 237      ( 10 Dec 2020 05:58 )             46.5     12-10    138  |  100  |  17  ----------------------------<  131<H>  3.9   |  26  |  0.56    Ca    9.2      10 Dec 2020 05:54    TPro  x   /  Alb  x   /  TBili  0.6  /  DBili  x   /  AST  x   /  ALT  x   /  AlkPhos  x   12-09                 Allergies    No Known Allergies    Intolerances      MEDICATIONS:  Antibiotics:  ceFAZolin   IVPB 3000 milliGRAM(s) IV Intermittent once    Neuro:  ALPRAZolam 0.5 milliGRAM(s) Oral at bedtime PRN  methocarbamol 750 milliGRAM(s) Oral three times a day  ondansetron Injectable 4 milliGRAM(s) IV Push every 6 hours PRN  oxyCODONE    IR 5 milliGRAM(s) Oral every 3 hours PRN    Anticoagulation:    OTHER:  artificial  tears Solution 1 Drop(s) Both EYES every 2 hours PRN  atorvastatin 10 milliGRAM(s) Oral at bedtime  dicyclomine 20 milliGRAM(s) Oral two times a day before meals  lisinopril 40 milliGRAM(s) Oral daily  magnesium hydroxide Suspension 30 milliLiter(s) Oral every 12 hours PRN  pantoprazole    Tablet 40 milliGRAM(s) Oral before breakfast  polyethylene glycol 3350 17 Gram(s) Oral two times a day  senna 2 Tablet(s) Oral at bedtime  tamsulosin 0.4 milliGRAM(s) Oral at bedtime  triamterene 37.5 mG/hydrochlorothiazide 25 mG Tablet 1 Tablet(s) Oral daily    IVF:    CULTURES:    RADIOLOGY & ADDITIONAL TESTS:< from: MR Cervical Spine No Cont (12.09.20 @ 18:05) >  IMPRESSION:    -Status post ACDF spanning C3-C6.    -Improved appearance of spinal canal stenosis at C4-C5.    -Severe spinal canal stenosis again noted at C3-C4 as well as at C5-C6 where there is ventral cord indentation as described.             ASSESSMENT:     64 yo male with h/o HTN, Hyperlipidemia, obesity, chronic back problems for many years, developed shoulder pain radiating to BUE about 3 mos ago, MRI and CT scan revealed herniated disc and spinal stenosis. Now for surgical intervention.     65y Male Post-op day # 2 s/p C3-6 ACDF    PLAN:  NEURO: Stable, HMV d/cherie new dressind applied, cervical spine MRI report noted, Dr Maloney will discuss study with patient may need 2nd stage sx    CARDIOVASCULAR:  Hemodynamically stable on Maxide and Lisinopril    PULMONARY:  Keep O2 sat > 94%  Continue incentive spirometer    RENAL:  Voiding, k 3.9 today  IVL    GI:  Tolerating diet  Bowel regimen: Miralax and Senna    HEME:  H/H stable    ID: Afebrile    ENDO: No issue    DVT PROPHYLAXIS:  [x] Venodynes                                [x] Heparin/Lovenox    FALL RISK:  [x] Low Risk                                    [] Impulsive

## 2020-12-10 NOTE — DISCHARGE NOTE NURSING/CASE MANAGEMENT/SOCIAL WORK - PATIENT PORTAL LINK FT
You can access the FollowMyHealth Patient Portal offered by [facility] by registering at the following website: [email or web address]. By joining [facility]’s FollowMyHealth portal, you will also be able to view your health information using other applications (apps) compatible with our system.

## 2020-12-10 NOTE — DISCHARGE NOTE NURSING/CASE MANAGEMENT/SOCIAL WORK - NSDCFUADDAPPT_GEN_ALL_CORE_FT
Please make an appointment to see Dr Maloney in 2 weeks for follow up, please call His office for an appointment

## 2020-12-11 ENCOUNTER — APPOINTMENT (OUTPATIENT)
Dept: SPINE | Facility: HOSPITAL | Age: 65
End: 2020-12-11

## 2020-12-17 ENCOUNTER — APPOINTMENT (OUTPATIENT)
Dept: SPINE | Facility: CLINIC | Age: 65
End: 2020-12-17
Payer: MEDICARE

## 2020-12-17 VITALS
TEMPERATURE: 97 F | BODY MASS INDEX: 37.51 KG/M2 | WEIGHT: 265 LBS | DIASTOLIC BLOOD PRESSURE: 86 MMHG | HEART RATE: 68 BPM | SYSTOLIC BLOOD PRESSURE: 123 MMHG | OXYGEN SATURATION: 93 % | HEIGHT: 70.5 IN

## 2020-12-17 DIAGNOSIS — G89.18 OTHER ACUTE POSTPROCEDURAL PAIN: ICD-10-CM

## 2020-12-17 PROCEDURE — 99024 POSTOP FOLLOW-UP VISIT: CPT

## 2020-12-17 NOTE — HISTORY OF PRESENT ILLNESS
[FreeTextEntry1] : Hospital Course: \par Discharge Date	10-Dec-2020 \par Admission Date	08-Dec-2020 11:22 \par Reason for Admission	Shoulder pain radiating to both upper extremities \par Hospital Course	 \par 64 yo male with h/o HTN, Hyperlipidemia, obesity, chronic back problems for \par many years, developed shoulder pain radiating to BUE about 3 mos ago, MRI and \par CT scan revealed herniated disc and spinal stenosis. Now for surgical \par intervention. \par 65y Male Post-op day # 2 s/p C3-6 ACDF patient is clinically doing well. \par Post-op cervical MRI revealed \par Improved appearance of spinal canal stenosis at C4-C5. \par Severe spinal canal stenosis again noted at C3-C4 as well as at C5-C6 where \par there is ventral cord indentation as described. \par The report was discussed with patient with risks and benefits of second stage \par surgery ( C2-T2 posterior fusion) ; he opted not to have surgery at this time. \par Physical therapy evaluated him and out patient therapy was recommended. Patient \par condition is stable for discharge \par

## 2020-12-17 NOTE — PHYSICAL EXAM
[General Appearance - Alert] : alert [General Appearance - In No Acute Distress] : in no acute distress [Longitudinal] : longitudinal [Healing Well] : healing well [No Drainage] : without drainage [Normal Skin] : normal [Oriented To Time, Place, And Person] : oriented to person, place, and time [Impaired Insight] : insight and judgment were intact [Cranial Nerves Optic (II)] : visual acuity intact bilaterally,  pupils equal round and reactive to light [Cranial Nerves Oculomotor (III)] : extraocular motion intact [Cranial Nerves Trigeminal (V)] : facial sensation intact symmetrically [Cranial Nerves Facial (VII)] : face symmetrical [Cranial Nerves Vestibulocochlear (VIII)] : hearing was intact bilaterally [Cranial Nerves Glossopharyngeal (IX)] : tongue and palate midline [Cranial Nerves Accessory (XI - Cranial And Spinal)] : head turning and shoulder shrug symmetric [Cranial Nerves Hypoglossal (XII)] : there was no tongue deviation with protrusion [Motor Tone] : muscle tone was normal in all four extremities [Motor Strength] : muscle strength was normal in all four extremities [Neck Appearance] : the appearance of the neck was normal [] : no respiratory distress [Abdomen Soft] : soft [Abnormal Walk] : normal gait [FreeTextEntry1] : Anterior Neck

## 2020-12-17 NOTE — REASON FOR VISIT
[de-identified] : C3-4, C4-5, and C5-6 anterior cervical discectomy with interbody arthrodesis.\par  C3, C4, C5 and C6 titanium anterior screw-plate fixation.\par  C3-4, C4-5, C5-6 interbody prosthesis.\par  [de-identified] : Doing well\par Mild swallowing difficulty\par Anterior neck incision without signs of infection\par Reports overall improved quality of life and pain syndrome\par \par Improve sensation in hands and feet

## 2020-12-17 NOTE — ASSESSMENT
[FreeTextEntry1] : 65 year old man doing well post ACDF\par   Anterior Neck incision intact no drainage, no hematoma no signs of infection noted\par Steri Strips removed; Incisional care provided \par \par Follow up 3 weeks with new xrays\par Continue BLT precaution

## 2020-12-18 ENCOUNTER — NON-APPOINTMENT (OUTPATIENT)
Age: 65
End: 2020-12-18

## 2020-12-21 ENCOUNTER — OUTPATIENT (OUTPATIENT)
Dept: OUTPATIENT SERVICES | Facility: HOSPITAL | Age: 65
LOS: 1 days | End: 2020-12-21
Payer: MEDICARE

## 2020-12-21 ENCOUNTER — APPOINTMENT (OUTPATIENT)
Dept: RADIOLOGY | Facility: CLINIC | Age: 65
End: 2020-12-21
Payer: MEDICARE

## 2020-12-21 DIAGNOSIS — Z96.659 PRESENCE OF UNSPECIFIED ARTIFICIAL KNEE JOINT: Chronic | ICD-10-CM

## 2020-12-21 DIAGNOSIS — M48.02 SPINAL STENOSIS, CERVICAL REGION: ICD-10-CM

## 2020-12-21 DIAGNOSIS — Z98.890 OTHER SPECIFIED POSTPROCEDURAL STATES: Chronic | ICD-10-CM

## 2020-12-21 DIAGNOSIS — Z96.642 PRESENCE OF LEFT ARTIFICIAL HIP JOINT: Chronic | ICD-10-CM

## 2020-12-21 PROCEDURE — 72040 X-RAY EXAM NECK SPINE 2-3 VW: CPT | Mod: 26

## 2020-12-21 PROCEDURE — 72040 X-RAY EXAM NECK SPINE 2-3 VW: CPT

## 2020-12-28 PROCEDURE — 82247 BILIRUBIN TOTAL: CPT

## 2020-12-28 PROCEDURE — 80048 BASIC METABOLIC PNL TOTAL CA: CPT

## 2020-12-28 PROCEDURE — 85025 COMPLETE CBC W/AUTO DIFF WBC: CPT

## 2020-12-28 PROCEDURE — 84295 ASSAY OF SERUM SODIUM: CPT

## 2020-12-28 PROCEDURE — 76000 FLUOROSCOPY <1 HR PHYS/QHP: CPT

## 2020-12-28 PROCEDURE — 82565 ASSAY OF CREATININE: CPT

## 2020-12-28 PROCEDURE — 72141 MRI NECK SPINE W/O DYE: CPT

## 2020-12-28 PROCEDURE — C1889: CPT

## 2020-12-28 PROCEDURE — U0003: CPT

## 2020-12-28 PROCEDURE — C1713: CPT

## 2020-12-28 PROCEDURE — 93970 EXTREMITY STUDY: CPT

## 2020-12-28 PROCEDURE — 97161 PT EVAL LOW COMPLEX 20 MIN: CPT

## 2020-12-30 ENCOUNTER — APPOINTMENT (OUTPATIENT)
Dept: CT IMAGING | Facility: CLINIC | Age: 65
End: 2020-12-30
Payer: MEDICARE

## 2020-12-30 ENCOUNTER — OUTPATIENT (OUTPATIENT)
Dept: OUTPATIENT SERVICES | Facility: HOSPITAL | Age: 65
LOS: 1 days | End: 2020-12-30
Payer: MEDICARE

## 2020-12-30 DIAGNOSIS — Z96.642 PRESENCE OF LEFT ARTIFICIAL HIP JOINT: Chronic | ICD-10-CM

## 2020-12-30 DIAGNOSIS — Z96.659 PRESENCE OF UNSPECIFIED ARTIFICIAL KNEE JOINT: Chronic | ICD-10-CM

## 2020-12-30 DIAGNOSIS — Z00.8 ENCOUNTER FOR OTHER GENERAL EXAMINATION: ICD-10-CM

## 2020-12-30 DIAGNOSIS — Z98.890 OTHER SPECIFIED POSTPROCEDURAL STATES: Chronic | ICD-10-CM

## 2020-12-30 PROCEDURE — 72125 CT NECK SPINE W/O DYE: CPT | Mod: 26

## 2020-12-30 PROCEDURE — 72125 CT NECK SPINE W/O DYE: CPT

## 2021-01-14 ENCOUNTER — APPOINTMENT (OUTPATIENT)
Dept: SPINE | Facility: CLINIC | Age: 66
End: 2021-01-14
Payer: MEDICARE

## 2021-01-14 VITALS
BODY MASS INDEX: 37.51 KG/M2 | DIASTOLIC BLOOD PRESSURE: 80 MMHG | WEIGHT: 265 LBS | HEIGHT: 70.5 IN | SYSTOLIC BLOOD PRESSURE: 112 MMHG | TEMPERATURE: 97.9 F | OXYGEN SATURATION: 94 % | HEART RATE: 69 BPM

## 2021-01-14 PROCEDURE — 99024 POSTOP FOLLOW-UP VISIT: CPT

## 2021-01-15 NOTE — HISTORY OF PRESENT ILLNESS
[FreeTextEntry1] : 65 year old male s/p one month ACDF C 3 to C 6 .  He  has diminished pain and no leg radicular pain.  He reports soreness of his neck.  He has been able to sleep with no pain.  He is walking with a cane related to back pain.   He reports increased strength of his hands and overall 80% improvement.   Anterior neck incision is clean and dry.  No swallowing issues.  Ten days ago Mr Silver had his follow up cervical xrays that indicated a possibility of a C 1 fracture.  A CT scan of the cervical spine was performed and showed no signs of a C 1 fracture.  Mr. Silver has no posterior neck pain and has had significant improvement since  surgery.

## 2021-01-15 NOTE — PHYSICAL EXAM
[General Appearance - Alert] : alert [General Appearance - In No Acute Distress] : in no acute distress [General Appearance - Well Nourished] : well nourished [General Appearance - Well Developed] : well developed [Longitudinal] : longitudinal [Clean] : clean [Dry] : dry [Healing Well] : healing well [Intact] : intact [No Drainage] : without drainage [Normal Skin] : normal [Oriented To Time, Place, And Person] : oriented to person, place, and time [Impaired Insight] : insight and judgment were intact [Affect] : the affect was normal [Mood] : the mood was normal [Person] : oriented to person [Place] : oriented to place [Time] : oriented to time [Short Term Intact] : short term memory intact [Remote Intact] : remote memory intact [Registration Intact] : recent registration memory intact [Span Intact] : the attention span was normal [Concentration Intact] : normal concentrating ability [Visual Intact] : visual attention was ~T not ~L decreased [Fluency] : fluency intact [Comprehension] : comprehension intact [Reading] : reading intact [Current Events] : adequate knowledge of current events [Past History] : adequate knowledge of personal past history [Vocabulary] : adequate range of vocabulary [Involuntary Movements] : no involuntary movements were seen [Sensation Tactile Decrease] : light touch was intact [No Visual Abnormalities] : no visible abnormalities [Full ROM] : full ROM [Sclera] : the sclera and conjunctiva were normal [Outer Ear] : the ears and nose were normal in appearance [Neck Appearance] : the appearance of the neck was normal [] : no respiratory distress [Skin Color & Pigmentation] : normal skin color and pigmentation [Erythema] : not erythematous [Tender] : not tender [Warm] : not warm [FreeTextEntry1] : anterior incision  [FreeTextEntry6] : trace weakness of left arm distally  [FreeTextEntry8] : gait unsteady

## 2021-01-15 NOTE — ASSESSMENT
[FreeTextEntry1] : 65 year old male s/p ACDF  C 3 to C 6.  No C 1 fracture and cervical CT shows stable hardware.  He may use a recumbent bike, maintaining proper posture and begin his normal routine.  No heavy lifting and avoid bending.  He will begin PT for both his neck and back.  He will avoid NSAIDS.  In six weeks he will return with follow up cervical xrays.   No voltaren cream and he may use salonpas.

## 2021-01-15 NOTE — DATA REVIEWED
[de-identified] : Cervical CT scan from [facility] 12/30/2021 [de-identified] : cervical xrays from [facility] 12/2021

## 2021-01-15 NOTE — REASON FOR VISIT
[Other: _____] : [unfilled] [de-identified] : 12/8/2020 [de-identified] : 36 [de-identified] : 5 [de-identified] : C 3 to C 6 anterior cervical discectomy and interbody arthrodesis with titanium plate at C 3 to C 6

## 2021-02-16 ENCOUNTER — APPOINTMENT (OUTPATIENT)
Dept: RADIOLOGY | Facility: CLINIC | Age: 66
End: 2021-02-16
Payer: MEDICARE

## 2021-02-16 ENCOUNTER — OUTPATIENT (OUTPATIENT)
Dept: OUTPATIENT SERVICES | Facility: HOSPITAL | Age: 66
LOS: 1 days | End: 2021-02-16
Payer: MEDICARE

## 2021-02-16 DIAGNOSIS — Z98.890 OTHER SPECIFIED POSTPROCEDURAL STATES: Chronic | ICD-10-CM

## 2021-02-16 DIAGNOSIS — Z96.642 PRESENCE OF LEFT ARTIFICIAL HIP JOINT: Chronic | ICD-10-CM

## 2021-02-16 DIAGNOSIS — M48.02 SPINAL STENOSIS, CERVICAL REGION: ICD-10-CM

## 2021-02-16 DIAGNOSIS — Z96.659 PRESENCE OF UNSPECIFIED ARTIFICIAL KNEE JOINT: Chronic | ICD-10-CM

## 2021-02-16 PROCEDURE — 72040 X-RAY EXAM NECK SPINE 2-3 VW: CPT

## 2021-02-16 PROCEDURE — 72040 X-RAY EXAM NECK SPINE 2-3 VW: CPT | Mod: 26

## 2021-02-18 ENCOUNTER — APPOINTMENT (OUTPATIENT)
Dept: SPINE | Facility: CLINIC | Age: 66
End: 2021-02-18
Payer: MEDICARE

## 2021-02-18 PROCEDURE — 99024 POSTOP FOLLOW-UP VISIT: CPT

## 2021-02-18 RX ORDER — CYCLOBENZAPRINE HYDROCHLORIDE 10 MG/1
10 TABLET, FILM COATED ORAL
Qty: 90 | Refills: 3 | Status: ACTIVE | COMMUNITY
Start: 2021-02-18 | End: 1900-01-01

## 2021-02-18 RX ORDER — METHOCARBAMOL 750 MG/1
750 TABLET, FILM COATED ORAL 3 TIMES DAILY
Qty: 90 | Refills: 3 | Status: DISCONTINUED | COMMUNITY
Start: 2021-01-08 | End: 2021-02-18

## 2021-02-18 NOTE — HISTORY OF PRESENT ILLNESS
[Home] : at home, [unfilled] , at the time of the visit. [Medical Office: ([facility])___] : at the medical office located in  [Other:____] : [unfilled] [Verbal consent obtained from patient] : the patient, [unfilled] [FreeTextEntry4] : Kim Lombardo  [FreeTextEntry1] : 2.5 months follow up following an ACDF C 3 to C 6.  He has on arm pain but  did have a facet block recently which has resolved his pain.  He is using a bone stim.  The current xrays show good alignment and construct.  He is back to his normal routine.  He continues to use a  recumbent bike .

## 2021-02-18 NOTE — ASSESSMENT
[FreeTextEntry1] : 65 year old male 2.5 months ACDF and pain free.   He has lower back pain relieved from a facet block.  He will continue to use a bike and light exercises.  Avoid heavy lifting.  Use Tylenol PM as needed.  Cyclobenzaprine will be ordered for PRN.  He will return n three months with cervical xrays.

## 2021-03-03 ENCOUNTER — TRANSCRIPTION ENCOUNTER (OUTPATIENT)
Age: 66
End: 2021-03-03

## 2021-04-16 ENCOUNTER — TRANSCRIPTION ENCOUNTER (OUTPATIENT)
Age: 66
End: 2021-04-16

## 2021-05-12 ENCOUNTER — TRANSCRIPTION ENCOUNTER (OUTPATIENT)
Age: 66
End: 2021-05-12

## 2021-05-14 ENCOUNTER — OUTPATIENT (OUTPATIENT)
Dept: OUTPATIENT SERVICES | Facility: HOSPITAL | Age: 66
LOS: 1 days | End: 2021-05-14

## 2021-05-14 DIAGNOSIS — Z96.659 PRESENCE OF UNSPECIFIED ARTIFICIAL KNEE JOINT: Chronic | ICD-10-CM

## 2021-05-14 DIAGNOSIS — Z98.890 OTHER SPECIFIED POSTPROCEDURAL STATES: Chronic | ICD-10-CM

## 2021-05-14 DIAGNOSIS — Z96.642 PRESENCE OF LEFT ARTIFICIAL HIP JOINT: Chronic | ICD-10-CM

## 2021-05-14 DIAGNOSIS — Z00.8 ENCOUNTER FOR OTHER GENERAL EXAMINATION: ICD-10-CM

## 2021-05-18 ENCOUNTER — OUTPATIENT (OUTPATIENT)
Dept: OUTPATIENT SERVICES | Facility: HOSPITAL | Age: 66
LOS: 1 days | End: 2021-05-18
Payer: MEDICARE

## 2021-05-18 ENCOUNTER — APPOINTMENT (OUTPATIENT)
Dept: RADIOLOGY | Facility: CLINIC | Age: 66
End: 2021-05-18
Payer: MEDICARE

## 2021-05-18 DIAGNOSIS — Z98.890 OTHER SPECIFIED POSTPROCEDURAL STATES: Chronic | ICD-10-CM

## 2021-05-18 DIAGNOSIS — Z98.1 ARTHRODESIS STATUS: ICD-10-CM

## 2021-05-18 DIAGNOSIS — Z96.642 PRESENCE OF LEFT ARTIFICIAL HIP JOINT: Chronic | ICD-10-CM

## 2021-05-18 DIAGNOSIS — Z96.659 PRESENCE OF UNSPECIFIED ARTIFICIAL KNEE JOINT: Chronic | ICD-10-CM

## 2021-05-18 PROCEDURE — 72052 X-RAY EXAM NECK SPINE 6/>VWS: CPT

## 2021-05-18 PROCEDURE — 72052 X-RAY EXAM NECK SPINE 6/>VWS: CPT | Mod: 26

## 2021-05-20 ENCOUNTER — APPOINTMENT (OUTPATIENT)
Dept: SPINE | Facility: CLINIC | Age: 66
End: 2021-05-20
Payer: MEDICARE

## 2021-05-20 VITALS
TEMPERATURE: 98 F | HEART RATE: 71 BPM | DIASTOLIC BLOOD PRESSURE: 86 MMHG | SYSTOLIC BLOOD PRESSURE: 126 MMHG | OXYGEN SATURATION: 94 % | HEIGHT: 71 IN | BODY MASS INDEX: 35.56 KG/M2 | WEIGHT: 254 LBS

## 2021-05-20 PROCEDURE — 99212 OFFICE O/P EST SF 10 MIN: CPT

## 2021-05-20 RX ORDER — DICLOFENAC SODIUM 100 MG/1
100 TABLET, FILM COATED, EXTENDED RELEASE ORAL
Qty: 30 | Refills: 0 | Status: ACTIVE | COMMUNITY
Start: 2021-05-20 | End: 1900-01-01

## 2021-05-20 NOTE — DATA REVIEWED
[de-identified] : 11/2020 Lumbar MRI from [facility]  [de-identified] : Cervcial xrays from [facility] 5/18/2021

## 2021-05-20 NOTE — REASON FOR VISIT
[Follow-Up: _____] : a [unfilled] follow-up visit [Other: _____] : [unfilled] [FreeTextEntry1] : \par 65 year old male  5 months s/p  ACDF C 3 to C 6 and doing well. He has no arm or neck pain and his cervical xrays show good alignment and hardware in place.  He is reporting progressive worsening of his lower back pain he has for a few years.  He has had multiple epidural injections and NSAIDS with no relief.  He was on Diclofenac prior to cervical spine surgery and since he stopped this med and his back oain worsened.  Recent x-rays of the cervical spine show no change in hardware or alignment. Reviewing an MRI of the lumbar spine there is multilevel disc degenerative disc disease and loss of lordosis and moderate stenosis.

## 2021-05-20 NOTE — ASSESSMENT
[FreeTextEntry1] : \par \par 5 months s/p ACDF and doing well.  Pain free in his UE and neck.   Cervical xrays show good alignment and construct.  Lower back MRI shows flat back syndrome .  He will obtain scoliosis series and flexion extension lumbar xrays.  In three months a cervical xrays will be obtained  and he will return

## 2021-06-02 ENCOUNTER — APPOINTMENT (OUTPATIENT)
Dept: RADIOLOGY | Facility: CLINIC | Age: 66
End: 2021-06-02

## 2021-06-08 ENCOUNTER — OUTPATIENT (OUTPATIENT)
Dept: OUTPATIENT SERVICES | Facility: HOSPITAL | Age: 66
LOS: 1 days | End: 2021-06-08
Payer: MEDICARE

## 2021-06-08 ENCOUNTER — APPOINTMENT (OUTPATIENT)
Dept: RADIOLOGY | Facility: CLINIC | Age: 66
End: 2021-06-08
Payer: MEDICARE

## 2021-06-08 DIAGNOSIS — Z98.890 OTHER SPECIFIED POSTPROCEDURAL STATES: Chronic | ICD-10-CM

## 2021-06-08 DIAGNOSIS — Z96.659 PRESENCE OF UNSPECIFIED ARTIFICIAL KNEE JOINT: Chronic | ICD-10-CM

## 2021-06-08 DIAGNOSIS — Z96.642 PRESENCE OF LEFT ARTIFICIAL HIP JOINT: Chronic | ICD-10-CM

## 2021-06-08 DIAGNOSIS — M48.02 SPINAL STENOSIS, CERVICAL REGION: ICD-10-CM

## 2021-06-08 PROCEDURE — 72082 X-RAY EXAM ENTIRE SPI 2/3 VW: CPT | Mod: 26

## 2021-06-08 PROCEDURE — 72082 X-RAY EXAM ENTIRE SPI 2/3 VW: CPT

## 2021-06-08 PROCEDURE — 72110 X-RAY EXAM L-2 SPINE 4/>VWS: CPT

## 2021-06-08 PROCEDURE — 72084 X-RAY EXAM ENTIRE SPI 6/> VW: CPT

## 2021-06-15 ENCOUNTER — NON-APPOINTMENT (OUTPATIENT)
Age: 66
End: 2021-06-15

## 2021-07-28 ENCOUNTER — APPOINTMENT (OUTPATIENT)
Dept: RADIOLOGY | Facility: CLINIC | Age: 66
End: 2021-07-28
Payer: MEDICARE

## 2021-07-28 ENCOUNTER — OUTPATIENT (OUTPATIENT)
Dept: OUTPATIENT SERVICES | Facility: HOSPITAL | Age: 66
LOS: 1 days | End: 2021-07-28
Payer: MEDICARE

## 2021-07-28 DIAGNOSIS — Z98.1 ARTHRODESIS STATUS: ICD-10-CM

## 2021-07-28 DIAGNOSIS — Z98.890 OTHER SPECIFIED POSTPROCEDURAL STATES: Chronic | ICD-10-CM

## 2021-07-28 DIAGNOSIS — Z96.659 PRESENCE OF UNSPECIFIED ARTIFICIAL KNEE JOINT: Chronic | ICD-10-CM

## 2021-07-28 DIAGNOSIS — Z96.642 PRESENCE OF LEFT ARTIFICIAL HIP JOINT: Chronic | ICD-10-CM

## 2021-07-28 PROCEDURE — 72040 X-RAY EXAM NECK SPINE 2-3 VW: CPT

## 2021-07-28 PROCEDURE — 72082 X-RAY EXAM ENTIRE SPI 2/3 VW: CPT

## 2021-07-28 PROCEDURE — 72040 X-RAY EXAM NECK SPINE 2-3 VW: CPT | Mod: 26,59

## 2021-07-28 PROCEDURE — 72084 X-RAY EXAM ENTIRE SPI 6/> VW: CPT

## 2021-07-28 PROCEDURE — 72082 X-RAY EXAM ENTIRE SPI 2/3 VW: CPT | Mod: 26

## 2021-07-28 PROCEDURE — 72052 X-RAY EXAM NECK SPINE 6/>VWS: CPT

## 2021-07-29 ENCOUNTER — APPOINTMENT (OUTPATIENT)
Dept: SPINE | Facility: CLINIC | Age: 66
End: 2021-07-29
Payer: MEDICARE

## 2021-07-29 PROCEDURE — 99212 OFFICE O/P EST SF 10 MIN: CPT | Mod: 95

## 2021-07-29 NOTE — HISTORY OF PRESENT ILLNESS
[Home] : at home, [unfilled] , at the time of the visit. [Medical Office: ([facility])___] : at the medical office located in  [Other:____] : [unfilled] [Verbal consent obtained from patient] : the patient, [unfilled] [FreeTextEntry4] : Kim Lombardo

## 2021-07-29 NOTE — ASSESSMENT
[FreeTextEntry1] : \par \par Follow up  in three months with cervical xrays and continue pain management.

## 2021-07-29 NOTE — REASON FOR VISIT
[Follow-Up: _____] : a [unfilled] follow-up visit [FreeTextEntry1] : \par 7 month follow up  ACDF  C 3 to C 6 and occasional  neck pain.  His feet and hand numbness are resolved.  Cervical xrays show solid fusion on the top section of his fusion.  Long plate standing x-rays show known sagittal imbalance and he kelly shave significant lower back  pain that is being treated  with epidural injections.  SVA =8, PI-LL = 30 DEGREES.  Presently he has significant relief with the injections.

## 2021-08-13 ENCOUNTER — RX RENEWAL (OUTPATIENT)
Age: 66
End: 2021-08-13

## 2021-08-13 DIAGNOSIS — K58.9 IRRITABLE BOWEL SYNDROME W/OUT DIARRHEA: ICD-10-CM

## 2021-08-13 RX ORDER — DICYCLOMINE HYDROCHLORIDE 20 MG/1
20 TABLET ORAL
Qty: 270 | Refills: 3 | Status: ACTIVE | COMMUNITY
Start: 2021-08-13 | End: 1900-01-01

## 2021-09-02 ENCOUNTER — NON-APPOINTMENT (OUTPATIENT)
Age: 66
End: 2021-09-02

## 2021-10-19 ENCOUNTER — NON-APPOINTMENT (OUTPATIENT)
Age: 66
End: 2021-10-19

## 2022-01-10 ENCOUNTER — RESULT REVIEW (OUTPATIENT)
Age: 67
End: 2022-01-10

## 2022-01-18 ENCOUNTER — OUTPATIENT (OUTPATIENT)
Dept: OUTPATIENT SERVICES | Facility: HOSPITAL | Age: 67
LOS: 1 days | End: 2022-01-18
Payer: MEDICARE

## 2022-01-18 ENCOUNTER — APPOINTMENT (OUTPATIENT)
Dept: RADIOLOGY | Facility: CLINIC | Age: 67
End: 2022-01-18
Payer: MEDICARE

## 2022-01-18 DIAGNOSIS — Z96.642 PRESENCE OF LEFT ARTIFICIAL HIP JOINT: Chronic | ICD-10-CM

## 2022-01-18 DIAGNOSIS — Z96.659 PRESENCE OF UNSPECIFIED ARTIFICIAL KNEE JOINT: Chronic | ICD-10-CM

## 2022-01-18 DIAGNOSIS — M48.02 SPINAL STENOSIS, CERVICAL REGION: ICD-10-CM

## 2022-01-18 DIAGNOSIS — Z98.890 OTHER SPECIFIED POSTPROCEDURAL STATES: Chronic | ICD-10-CM

## 2022-01-18 PROCEDURE — 72050 X-RAY EXAM NECK SPINE 4/5VWS: CPT

## 2022-01-18 PROCEDURE — 72050 X-RAY EXAM NECK SPINE 4/5VWS: CPT | Mod: 26

## 2022-01-20 ENCOUNTER — APPOINTMENT (OUTPATIENT)
Dept: SPINE | Facility: CLINIC | Age: 67
End: 2022-01-20
Payer: MEDICARE

## 2022-01-20 DIAGNOSIS — M48.02 SPINAL STENOSIS, CERVICAL REGION: ICD-10-CM

## 2022-01-20 DIAGNOSIS — Q76.49 OTHER CONGENITAL MALFORMATIONS OF SPINE, NOT ASSOCIATED WITH SCOLIOSIS: ICD-10-CM

## 2022-01-20 DIAGNOSIS — Z78.9 OTHER SPECIFIED HEALTH STATUS: ICD-10-CM

## 2022-01-20 PROCEDURE — 99212 OFFICE O/P EST SF 10 MIN: CPT | Mod: 95

## 2022-01-20 NOTE — END OF VISIT
[FreeTextEntry3] : I, Dr. Amador Maloney, evaluated this patient with the Nurse Practitioner, Kim Lombardo, and established the plan of care.  I personally discussed this patient  during the key portions of the history and exam with the Nurse Practitioner at the time of the visit.  I agree with the assessment and plan as written, unless noted below.\par

## 2022-01-20 NOTE — REASON FOR VISIT
[Follow-Up: _____] : a [unfilled] follow-up visit [Other: _____] : [unfilled] [FreeTextEntry1] : \par Mr Silver is here for a follow up telehealth visit and he is doing well with occasional neck pain and no radicular pain.  Fractures of the C 6 screw bilaterally is noted on cervical xrays with pseudoarthrodesis.

## 2022-01-20 NOTE — ASSESSMENT
[FreeTextEntry1] : 13 months postop ACDF .  Doing well and no radicular pain.  Occasional neck pain.  Cervical xrays show a C 6 bilateral screw fracture with pseudoarthrodesis at C 5 C 6 level.  In three months a follow up cervical xray will be ordered and he will follow up in the office.  No neurosurgical intervention at t this time.

## 2022-01-20 NOTE — HISTORY OF PRESENT ILLNESS
[Home] : at home, [unfilled] , at the time of the visit. [Medical Office: ([facility])___] : at the medical office located in  [Other:____] : [unfilled] [Verbal consent obtained from patient] : the patient, [unfilled] [FreeTextEntry3] : K [FreeTextEntry4] : Kim Lombardo

## 2022-02-18 ENCOUNTER — NON-APPOINTMENT (OUTPATIENT)
Age: 67
End: 2022-02-18

## 2022-04-12 ENCOUNTER — APPOINTMENT (OUTPATIENT)
Dept: RADIOLOGY | Facility: CLINIC | Age: 67
End: 2022-04-12
Payer: MEDICARE

## 2022-04-12 ENCOUNTER — OUTPATIENT (OUTPATIENT)
Dept: OUTPATIENT SERVICES | Facility: HOSPITAL | Age: 67
LOS: 1 days | End: 2022-04-12
Payer: MEDICARE

## 2022-04-12 DIAGNOSIS — Z98.1 ARTHRODESIS STATUS: ICD-10-CM

## 2022-04-12 DIAGNOSIS — Z98.890 OTHER SPECIFIED POSTPROCEDURAL STATES: Chronic | ICD-10-CM

## 2022-04-12 DIAGNOSIS — Z96.642 PRESENCE OF LEFT ARTIFICIAL HIP JOINT: Chronic | ICD-10-CM

## 2022-04-12 DIAGNOSIS — Z96.659 PRESENCE OF UNSPECIFIED ARTIFICIAL KNEE JOINT: Chronic | ICD-10-CM

## 2022-04-12 PROCEDURE — 72052 X-RAY EXAM NECK SPINE 6/>VWS: CPT | Mod: 26

## 2022-04-12 PROCEDURE — 72052 X-RAY EXAM NECK SPINE 6/>VWS: CPT

## 2022-04-14 ENCOUNTER — APPOINTMENT (OUTPATIENT)
Dept: SPINE | Facility: CLINIC | Age: 67
End: 2022-04-14
Payer: MEDICARE

## 2022-04-14 ENCOUNTER — NON-APPOINTMENT (OUTPATIENT)
Age: 67
End: 2022-04-14

## 2022-04-14 VITALS
OXYGEN SATURATION: 93 % | HEIGHT: 71 IN | DIASTOLIC BLOOD PRESSURE: 80 MMHG | SYSTOLIC BLOOD PRESSURE: 120 MMHG | BODY MASS INDEX: 36.68 KG/M2 | HEART RATE: 64 BPM | WEIGHT: 262 LBS

## 2022-04-14 DIAGNOSIS — Z98.1 ARTHRODESIS STATUS: ICD-10-CM

## 2022-04-14 PROCEDURE — 99213 OFFICE O/P EST LOW 20 MIN: CPT

## 2022-04-14 NOTE — PHYSICAL EXAM
[Motor Strength] : muscle strength was normal in all four extremities [Sensation Tactile Decrease] : light touch was intact [Abnormal Walk] : normal gait [Woodward] : Woodward's sign was not demonstrated

## 2022-04-14 NOTE — REASON FOR VISIT
[Follow-Up: _____] : a [unfilled] follow-up visit [Other: _____] : [unfilled] [FreeTextEntry1] : Mr Silver is 16 months postop ACDF. He reports occasional neck pain and no radicular pain. Takes diclofenac with relief.  He has known fractures of the C 6 screw bilaterally with pseudoarthrodesis. Reports  weakness. Limps secondary to left hip pain.

## 2022-11-22 ENCOUNTER — APPOINTMENT (OUTPATIENT)
Dept: GASTROENTEROLOGY | Facility: CLINIC | Age: 67
End: 2022-11-22

## 2023-07-12 NOTE — PATIENT PROFILE ADULT - HAS THE PATIENT RECEIVED THE INFLUENZA VACCINE THIS SEASON?
yes... Electrodesiccation And Curettage Text: The wound bed was treated with electrodesiccation and curettage after the biopsy was performed.

## 2024-10-15 NOTE — DISCHARGE NOTE NURSING/CASE MANAGEMENT/SOCIAL WORK - NURSING SECTION COMPLETE
----- Message from Keeley Mccormick MD sent at 10/9/2024  8:53 AM EDT -----  Jake Shaver,    The pt is stating that she had a cardiology consult and Echocardiogram at [facility], and I am unable to view this in the chart.  Do you know what could be occurring?  Does she have a double chart?    Thanks,  Dr. Mccormick  
Per pt's chart, Echo was completed on 8/28/2024 results on pt's chart under Cardiology.     Recent visit with Cardiologist was on 8/9/2024 with Solitario Wiggins.     No double charts at this moment.   
Thank you, I see the encounters now, wasn't coming up before.
Patient/Caregiver provided printed discharge information.